# Patient Record
Sex: MALE | Race: WHITE | NOT HISPANIC OR LATINO
[De-identification: names, ages, dates, MRNs, and addresses within clinical notes are randomized per-mention and may not be internally consistent; named-entity substitution may affect disease eponyms.]

---

## 2019-11-26 ENCOUNTER — APPOINTMENT (OUTPATIENT)
Dept: ORTHOPEDIC SURGERY | Facility: CLINIC | Age: 69
End: 2019-11-26
Payer: MEDICARE

## 2019-11-26 VITALS
WEIGHT: 200 LBS | SYSTOLIC BLOOD PRESSURE: 153 MMHG | HEART RATE: 80 BPM | HEIGHT: 70 IN | BODY MASS INDEX: 28.63 KG/M2 | DIASTOLIC BLOOD PRESSURE: 87 MMHG

## 2019-11-26 DIAGNOSIS — Z86.79 PERSONAL HISTORY OF OTHER DISEASES OF THE CIRCULATORY SYSTEM: ICD-10-CM

## 2019-11-26 DIAGNOSIS — M79.641 PAIN IN RIGHT HAND: ICD-10-CM

## 2019-11-26 DIAGNOSIS — Z82.61 FAMILY HISTORY OF ARTHRITIS: ICD-10-CM

## 2019-11-26 DIAGNOSIS — Z78.9 OTHER SPECIFIED HEALTH STATUS: ICD-10-CM

## 2019-11-26 DIAGNOSIS — M65.331 TRIGGER FINGER, RIGHT MIDDLE FINGER: ICD-10-CM

## 2019-11-26 DIAGNOSIS — M18.11 UNILATERAL PRIMARY OSTEOARTHRITIS OF FIRST CARPOMETACARPAL JOINT, RIGHT HAND: ICD-10-CM

## 2019-11-26 DIAGNOSIS — Z87.39 PERSONAL HISTORY OF OTHER DISEASES OF THE MUSCULOSKELETAL SYSTEM AND CONNECTIVE TISSUE: ICD-10-CM

## 2019-11-26 DIAGNOSIS — Z80.9 FAMILY HISTORY OF MALIGNANT NEOPLASM, UNSPECIFIED: ICD-10-CM

## 2019-11-26 DIAGNOSIS — Z72.3 LACK OF PHYSICAL EXERCISE: ICD-10-CM

## 2019-11-26 PROCEDURE — 99203 OFFICE O/P NEW LOW 30 MIN: CPT

## 2019-11-27 PROBLEM — M65.331 TRIGGER MIDDLE FINGER OF RIGHT HAND: Status: ACTIVE | Noted: 2019-11-27

## 2019-11-27 PROBLEM — Z78.9 DOES NOT USE ILLICIT DRUGS: Status: ACTIVE | Noted: 2019-11-26

## 2019-11-27 PROBLEM — Z80.9 FAMILY HISTORY OF MALIGNANT NEOPLASM: Status: ACTIVE | Noted: 2019-11-26

## 2019-11-27 PROBLEM — M79.641 RIGHT HAND PAIN: Status: ACTIVE | Noted: 2019-11-27

## 2019-11-27 PROBLEM — M18.11 PRIMARY OSTEOARTHRITIS OF FIRST CARPOMETACARPAL JOINT OF RIGHT HAND: Status: ACTIVE | Noted: 2019-11-27

## 2019-11-27 PROBLEM — Z72.3 DOES NOT EXERCISE: Status: ACTIVE | Noted: 2019-11-26

## 2019-11-27 PROBLEM — Z82.61 FAMILY HISTORY OF ARTHRITIS: Status: ACTIVE | Noted: 2019-11-26

## 2019-11-27 PROBLEM — Z87.39 HISTORY OF ARTHRITIS: Status: RESOLVED | Noted: 2019-11-26 | Resolved: 2019-11-27

## 2019-11-27 PROBLEM — Z86.79 HISTORY OF HYPERTENSION: Status: RESOLVED | Noted: 2019-11-26 | Resolved: 2019-11-27

## 2019-11-27 PROBLEM — Z78.9 CURRENT NON-SMOKER: Status: ACTIVE | Noted: 2019-11-26

## 2019-11-27 RX ORDER — LOSARTAN POTASSIUM 100 MG/1
TABLET, FILM COATED ORAL
Refills: 0 | Status: ACTIVE | COMMUNITY

## 2019-11-27 RX ORDER — HYDROCHLOROTHIAZIDE 12.5 MG/1
CAPSULE ORAL
Refills: 0 | Status: ACTIVE | COMMUNITY

## 2019-11-27 RX ORDER — FINASTERIDE 1 MG/1
TABLET ORAL
Refills: 0 | Status: ACTIVE | COMMUNITY

## 2021-09-09 ENCOUNTER — TRANSCRIPTION ENCOUNTER (OUTPATIENT)
Age: 71
End: 2021-09-09

## 2022-08-04 ENCOUNTER — NON-APPOINTMENT (OUTPATIENT)
Age: 72
End: 2022-08-04

## 2022-08-05 ENCOUNTER — INPATIENT (INPATIENT)
Facility: HOSPITAL | Age: 72
LOS: 9 days | Discharge: HOME HEALTH | DRG: 871 | End: 2022-08-15
Attending: SURGERY | Admitting: SURGERY
Payer: MEDICARE

## 2022-08-05 VITALS
TEMPERATURE: 101 F | HEART RATE: 115 BPM | WEIGHT: 188.05 LBS | DIASTOLIC BLOOD PRESSURE: 68 MMHG | SYSTOLIC BLOOD PRESSURE: 116 MMHG | RESPIRATION RATE: 18 BRPM | OXYGEN SATURATION: 95 % | HEIGHT: 69 IN

## 2022-08-05 DIAGNOSIS — K35.32 ACUTE APPENDICITIS WITH PERFORATION, LOCALIZED PERITONITIS, AND GANGRENE, WITHOUT ABSCESS: ICD-10-CM

## 2022-08-05 LAB
ALBUMIN SERPL ELPH-MCNC: 4 G/DL — SIGNIFICANT CHANGE UP (ref 3.3–5)
ALP SERPL-CCNC: 114 U/L — SIGNIFICANT CHANGE UP (ref 40–120)
ALT FLD-CCNC: 33 U/L — SIGNIFICANT CHANGE UP (ref 10–45)
ANION GAP SERPL CALC-SCNC: 13 MMOL/L — SIGNIFICANT CHANGE UP (ref 5–17)
APPEARANCE UR: CLEAR — SIGNIFICANT CHANGE UP
APTT BLD: 33.5 SEC — SIGNIFICANT CHANGE UP (ref 27.5–35.5)
AST SERPL-CCNC: 30 U/L — SIGNIFICANT CHANGE UP (ref 10–40)
BACTERIA # UR AUTO: NEGATIVE — SIGNIFICANT CHANGE UP
BASE EXCESS BLDV CALC-SCNC: 2.9 MMOL/L — HIGH (ref -2–2)
BASOPHILS # BLD AUTO: 0.02 K/UL — SIGNIFICANT CHANGE UP (ref 0–0.2)
BASOPHILS NFR BLD AUTO: 0.2 % — SIGNIFICANT CHANGE UP (ref 0–2)
BILIRUB SERPL-MCNC: 0.7 MG/DL — SIGNIFICANT CHANGE UP (ref 0.2–1.2)
BILIRUB UR-MCNC: NEGATIVE — SIGNIFICANT CHANGE UP
BLD GP AB SCN SERPL QL: NEGATIVE — SIGNIFICANT CHANGE UP
BUN SERPL-MCNC: 22 MG/DL — SIGNIFICANT CHANGE UP (ref 7–23)
CA-I SERPL-SCNC: 1.19 MMOL/L — SIGNIFICANT CHANGE UP (ref 1.15–1.33)
CALCIUM SERPL-MCNC: 9.5 MG/DL — SIGNIFICANT CHANGE UP (ref 8.4–10.5)
CHLORIDE BLDV-SCNC: 99 MMOL/L — SIGNIFICANT CHANGE UP (ref 96–108)
CHLORIDE SERPL-SCNC: 97 MMOL/L — SIGNIFICANT CHANGE UP (ref 96–108)
CO2 BLDV-SCNC: 29 MMOL/L — HIGH (ref 22–26)
CO2 SERPL-SCNC: 24 MMOL/L — SIGNIFICANT CHANGE UP (ref 22–31)
COLOR SPEC: YELLOW — SIGNIFICANT CHANGE UP
CREAT SERPL-MCNC: 1.45 MG/DL — HIGH (ref 0.5–1.3)
DIFF PNL FLD: NEGATIVE — SIGNIFICANT CHANGE UP
EGFR: 52 ML/MIN/1.73M2 — LOW
EOSINOPHIL # BLD AUTO: 0 K/UL — SIGNIFICANT CHANGE UP (ref 0–0.5)
EOSINOPHIL NFR BLD AUTO: 0 % — SIGNIFICANT CHANGE UP (ref 0–6)
EPI CELLS # UR: 0 /HPF — SIGNIFICANT CHANGE UP
GAS PNL BLDV: 132 MMOL/L — LOW (ref 136–145)
GAS PNL BLDV: SIGNIFICANT CHANGE UP
GAS PNL BLDV: SIGNIFICANT CHANGE UP
GLUCOSE BLDV-MCNC: 126 MG/DL — HIGH (ref 70–99)
GLUCOSE SERPL-MCNC: 126 MG/DL — HIGH (ref 70–99)
GLUCOSE UR QL: NEGATIVE — SIGNIFICANT CHANGE UP
HCO3 BLDV-SCNC: 28 MMOL/L — SIGNIFICANT CHANGE UP (ref 22–29)
HCT VFR BLD CALC: 39.8 % — SIGNIFICANT CHANGE UP (ref 39–50)
HCT VFR BLDA CALC: 41 % — SIGNIFICANT CHANGE UP (ref 39–51)
HGB BLD CALC-MCNC: 13.6 G/DL — SIGNIFICANT CHANGE UP (ref 12.6–17.4)
HGB BLD-MCNC: 13.3 G/DL — SIGNIFICANT CHANGE UP (ref 13–17)
HYALINE CASTS # UR AUTO: 1 /LPF — SIGNIFICANT CHANGE UP (ref 0–2)
IMM GRANULOCYTES NFR BLD AUTO: 0.5 % — SIGNIFICANT CHANGE UP (ref 0–1.5)
INR BLD: 1.35 RATIO — HIGH (ref 0.88–1.16)
KETONES UR-MCNC: NEGATIVE — SIGNIFICANT CHANGE UP
LACTATE BLDV-MCNC: 0.9 MMOL/L — SIGNIFICANT CHANGE UP (ref 0.7–2)
LEUKOCYTE ESTERASE UR-ACNC: NEGATIVE — SIGNIFICANT CHANGE UP
LYMPHOCYTES # BLD AUTO: 0.66 K/UL — LOW (ref 1–3.3)
LYMPHOCYTES # BLD AUTO: 5.8 % — LOW (ref 13–44)
MCHC RBC-ENTMCNC: 31.5 PG — SIGNIFICANT CHANGE UP (ref 27–34)
MCHC RBC-ENTMCNC: 33.4 GM/DL — SIGNIFICANT CHANGE UP (ref 32–36)
MCV RBC AUTO: 94.3 FL — SIGNIFICANT CHANGE UP (ref 80–100)
MONOCYTES # BLD AUTO: 0.74 K/UL — SIGNIFICANT CHANGE UP (ref 0–0.9)
MONOCYTES NFR BLD AUTO: 6.5 % — SIGNIFICANT CHANGE UP (ref 2–14)
NEUTROPHILS # BLD AUTO: 9.94 K/UL — HIGH (ref 1.8–7.4)
NEUTROPHILS NFR BLD AUTO: 87 % — HIGH (ref 43–77)
NITRITE UR-MCNC: NEGATIVE — SIGNIFICANT CHANGE UP
NRBC # BLD: 0 /100 WBCS — SIGNIFICANT CHANGE UP (ref 0–0)
PCO2 BLDV: 43 MMHG — SIGNIFICANT CHANGE UP (ref 42–55)
PH BLDV: 7.42 — SIGNIFICANT CHANGE UP (ref 7.32–7.43)
PH UR: 6.5 — SIGNIFICANT CHANGE UP (ref 5–8)
PLATELET # BLD AUTO: 167 K/UL — SIGNIFICANT CHANGE UP (ref 150–400)
PO2 BLDV: 35 MMHG — SIGNIFICANT CHANGE UP (ref 25–45)
POTASSIUM BLDV-SCNC: 3.6 MMOL/L — SIGNIFICANT CHANGE UP (ref 3.5–5.1)
POTASSIUM SERPL-MCNC: 3.6 MMOL/L — SIGNIFICANT CHANGE UP (ref 3.5–5.3)
POTASSIUM SERPL-SCNC: 3.6 MMOL/L — SIGNIFICANT CHANGE UP (ref 3.5–5.3)
PROT SERPL-MCNC: 7.9 G/DL — SIGNIFICANT CHANGE UP (ref 6–8.3)
PROT UR-MCNC: ABNORMAL
PROTHROM AB SERPL-ACNC: 15.7 SEC — HIGH (ref 10.5–13.4)
RBC # BLD: 4.22 M/UL — SIGNIFICANT CHANGE UP (ref 4.2–5.8)
RBC # FLD: 13 % — SIGNIFICANT CHANGE UP (ref 10.3–14.5)
RBC CASTS # UR COMP ASSIST: 2 /HPF — SIGNIFICANT CHANGE UP (ref 0–4)
RH IG SCN BLD-IMP: NEGATIVE — SIGNIFICANT CHANGE UP
SAO2 % BLDV: 60.2 % — LOW (ref 67–88)
SARS-COV-2 RNA SPEC QL NAA+PROBE: SIGNIFICANT CHANGE UP
SODIUM SERPL-SCNC: 134 MMOL/L — LOW (ref 135–145)
SP GR SPEC: >1.05 (ref 1.01–1.02)
UROBILINOGEN FLD QL: NEGATIVE — SIGNIFICANT CHANGE UP
WBC # BLD: 11.42 K/UL — HIGH (ref 3.8–10.5)
WBC # FLD AUTO: 11.42 K/UL — HIGH (ref 3.8–10.5)
WBC UR QL: 2 /HPF — SIGNIFICANT CHANGE UP (ref 0–5)

## 2022-08-05 PROCEDURE — 93010 ELECTROCARDIOGRAM REPORT: CPT

## 2022-08-05 PROCEDURE — 99285 EMERGENCY DEPT VISIT HI MDM: CPT | Mod: FS

## 2022-08-05 PROCEDURE — 71045 X-RAY EXAM CHEST 1 VIEW: CPT | Mod: 26

## 2022-08-05 PROCEDURE — 74177 CT ABD & PELVIS W/CONTRAST: CPT | Mod: 26,MA

## 2022-08-05 RX ORDER — ACETAMINOPHEN 500 MG
975 TABLET ORAL ONCE
Refills: 0 | Status: COMPLETED | OUTPATIENT
Start: 2022-08-05 | End: 2022-08-05

## 2022-08-05 RX ORDER — SODIUM CHLORIDE 9 MG/ML
1000 INJECTION, SOLUTION INTRAVENOUS
Refills: 0 | Status: DISCONTINUED | OUTPATIENT
Start: 2022-08-05 | End: 2022-08-11

## 2022-08-05 RX ORDER — ACETAMINOPHEN 500 MG
1000 TABLET ORAL EVERY 6 HOURS
Refills: 0 | Status: COMPLETED | OUTPATIENT
Start: 2022-08-05 | End: 2022-08-06

## 2022-08-05 RX ORDER — SODIUM CHLORIDE 9 MG/ML
1000 INJECTION, SOLUTION INTRAVENOUS ONCE
Refills: 0 | Status: COMPLETED | OUTPATIENT
Start: 2022-08-05 | End: 2022-08-05

## 2022-08-05 RX ORDER — OXYCODONE HYDROCHLORIDE 5 MG/1
2.5 TABLET ORAL EVERY 4 HOURS
Refills: 0 | Status: DISCONTINUED | OUTPATIENT
Start: 2022-08-05 | End: 2022-08-05

## 2022-08-05 RX ORDER — KETOROLAC TROMETHAMINE 30 MG/ML
15 SYRINGE (ML) INJECTION ONCE
Refills: 0 | Status: DISCONTINUED | OUTPATIENT
Start: 2022-08-05 | End: 2022-08-05

## 2022-08-05 RX ORDER — PIPERACILLIN AND TAZOBACTAM 4; .5 G/20ML; G/20ML
3.38 INJECTION, POWDER, LYOPHILIZED, FOR SOLUTION INTRAVENOUS ONCE
Refills: 0 | Status: COMPLETED | OUTPATIENT
Start: 2022-08-05 | End: 2022-08-05

## 2022-08-05 RX ORDER — OXYCODONE HYDROCHLORIDE 5 MG/1
5 TABLET ORAL EVERY 4 HOURS
Refills: 0 | Status: DISCONTINUED | OUTPATIENT
Start: 2022-08-05 | End: 2022-08-05

## 2022-08-05 RX ORDER — FINASTERIDE 5 MG/1
5 TABLET, FILM COATED ORAL DAILY
Refills: 0 | Status: DISCONTINUED | OUTPATIENT
Start: 2022-08-05 | End: 2022-08-15

## 2022-08-05 RX ORDER — HEPARIN SODIUM 5000 [USP'U]/ML
5000 INJECTION INTRAVENOUS; SUBCUTANEOUS EVERY 8 HOURS
Refills: 0 | Status: DISCONTINUED | OUTPATIENT
Start: 2022-08-05 | End: 2022-08-12

## 2022-08-05 RX ORDER — PIPERACILLIN AND TAZOBACTAM 4; .5 G/20ML; G/20ML
3.38 INJECTION, POWDER, LYOPHILIZED, FOR SOLUTION INTRAVENOUS EVERY 8 HOURS
Refills: 0 | Status: DISCONTINUED | OUTPATIENT
Start: 2022-08-05 | End: 2022-08-08

## 2022-08-05 RX ADMIN — HEPARIN SODIUM 5000 UNIT(S): 5000 INJECTION INTRAVENOUS; SUBCUTANEOUS at 21:44

## 2022-08-05 RX ADMIN — Medication 400 MILLIGRAM(S): at 23:21

## 2022-08-05 RX ADMIN — Medication 1000 MILLIGRAM(S): at 23:30

## 2022-08-05 RX ADMIN — Medication 15 MILLIGRAM(S): at 17:59

## 2022-08-05 RX ADMIN — Medication 975 MILLIGRAM(S): at 17:05

## 2022-08-05 RX ADMIN — SODIUM CHLORIDE 1000 MILLILITER(S): 9 INJECTION, SOLUTION INTRAVENOUS at 17:05

## 2022-08-05 RX ADMIN — SODIUM CHLORIDE 1000 MILLILITER(S): 9 INJECTION, SOLUTION INTRAVENOUS at 19:24

## 2022-08-05 RX ADMIN — PIPERACILLIN AND TAZOBACTAM 25 GRAM(S): 4; .5 INJECTION, POWDER, LYOPHILIZED, FOR SOLUTION INTRAVENOUS at 21:43

## 2022-08-05 RX ADMIN — PIPERACILLIN AND TAZOBACTAM 200 GRAM(S): 4; .5 INJECTION, POWDER, LYOPHILIZED, FOR SOLUTION INTRAVENOUS at 17:59

## 2022-08-05 NOTE — H&P ADULT - ASSESSMENT
Patient is a 71yo M w/ hx of HTN, BPH, kidney stones that presents with  waxing and waning fatigue for approximately 3-4 weeks, loss of appetite and 10-15 unintentional weight loss. Over the past 24 hrs, he developed RLQ abdominal pain, fevers and chills. Labs remarkable for leukocytosis to 11, and CT scan consistent with perforated appendicitis.       Plan:  - Admit to Red Team Surgery, under Dr. Ion Kilpatrick  - Pain control prn  - NPO/IVF/Zosyn  - on home meds   - DVT ppx       Discussed with Attending on Call, Dr. Ion Kilpatrick    [facility]  [id number]

## 2022-08-05 NOTE — H&P ADULT - NSICDXPASTMEDICALHX_GEN_ALL_CORE_FT
PAST MEDICAL HISTORY:  BPH (benign prostatic hyperplasia)     HTN (hypertension)     Kidney stones

## 2022-08-05 NOTE — PATIENT PROFILE ADULT - FALL HARM RISK - UNIVERSAL INTERVENTIONS
Bed in lowest position, wheels locked, appropriate side rails in place/Call bell, personal items and telephone in reach/Instruct patient to call for assistance before getting out of bed or chair/Non-slip footwear when patient is out of bed/[place] to call system/Physically safe environment - no spills, clutter or unnecessary equipment/Purposeful Proactive Rounding/Room/bathroom lighting operational, light cord in reach

## 2022-08-05 NOTE — H&P ADULT - NSHPPHYSICALEXAM_GEN_ALL_CORE
T(C): 37.8 (08-05-22 @ 18:00), Max: 39 (08-05-22 @ 17:46)  HR: 98 (08-05-22 @ 18:00) (93 - 115)  BP: 124/63 (08-05-22 @ 18:00) (102/70 - 141/72)  RR: 18 (08-05-22 @ 18:00) (18 - 18)  SpO2: 96% (08-05-22 @ 18:00) (95% - 97%)    CONSTITUTIONAL: Well groomed, no apparent distress  EYES: PERRLA and symmetric, EOMI, No conjunctival or scleral injection, non-icteric  RESPIRATORY: No respiratory distress, no use of accessory muscles; CTA b/l,   CARDIOVASCULAR:  +S1S2, no murmurs, no rubs, no gallops  GASTROINTESTINAL: Soft, mildly tender in RLQ, mildly distended, no rebound, no guarding; No palpable masses; no hepatosplenomegaly; no hernia palpated;  SKIN: No rashes or ulcers noted; no subcutaneous nodules or induration palpable  PSYCHIATRIC: Appropriate insight/judgment; A+O x 3, mood and affect appropriate, recent/remote memory intact

## 2022-08-05 NOTE — ED PROVIDER NOTE - CLINICAL SUMMARY MEDICAL DECISION MAKING FREE TEXT BOX
appendicitis outpt scan, no images here, sx requesting repeat scan as unable to obtain images, will cover w/ zosyn admit to sx pt hd stable aaox3

## 2022-08-05 NOTE — ED PROVIDER NOTE - PHYSICAL EXAMINATION
GEN: Pt ill appearing but non-toxic in NAD, A&Ox3.  PSYCH: Affect and mood appropriate.  EYES: Sclera white w/o injection, EOMI, PERRLA.   ENT: MM dry. Neck supple FROM. Airway patent.  RESP: CTA b/l, no wheezes, rales, or rhonchi.   CARDIAC: RRR, clear distinct S1, S2, no appreciable murmurs.  ABD: Abdomen soft, +RLQ ttp, no rebound or guarding. No CVAT b/l.  MSK: Moving all extremities.  NEURO: No focal motor or sensory deficits.  VASC: Radial pulses 2+ b/l. No edema or calf tenderness.  SKIN: No rashes or lesions.

## 2022-08-05 NOTE — ED PROVIDER NOTE - OBJECTIVE STATEMENT
72y M pmhx HTN, kidney stones, BPH, presents to ED c/o RLQ pain x 1 week, fever/chills x 2 days. Poor appetite w/ 15lbs weight loss, difficulty urinating and moving bowels over last month and a half. Went to [facility] and urologist today, found to have appendicitis on non-con CT abdomen and pelvis at [facility]  and referred to ED by urologist. Denies prior abd surgeries, nvd, dysuria, hematuria, chest pain, sob, cough.

## 2022-08-05 NOTE — ED ADULT NURSE REASSESSMENT NOTE - NS ED NURSE REASSESS COMMENT FT1
Report received from Ale WAKEFIELD in Red. Upon assessment, pt AxOx3, sitting up in bed, and speaking in full sentences. Pt denies current pain or discomfort. Pt updated on plan of care, awaiting surgery consult. Pt maintained on continuous cardiac monitor. Safety and comfort measures maintained- bed in lowest position, locked, and blanket given.

## 2022-08-05 NOTE — ED PROVIDER NOTE - NS ED ATTENDING STATEMENT MOD
This was a shared visit with the ARISTEO. I reviewed and verified the documentation and independently performed the documented:

## 2022-08-05 NOTE — ED ADULT TRIAGE NOTE - CHIEF COMPLAINT QUOTE
abdominal pain worsening x the passed 3 -4 weeks now associated with a fever since yesterday as high as 102F orally at home

## 2022-08-05 NOTE — H&P ADULT - ATTENDING COMMENTS
date of service 8/5/22    pt seen and examined  pt chart and imaging reviewed in detail  agree with note above  72M pmhx of HTN, BPH, kidney stones that presents with  waxing and waning fatigue for approximately 3-4 weeks, loss of appetite and 10-15 unintentional weight loss. Over the past 24 hrs, he developed RLQ abdominal pain, fevers and chills. Labs remarkable for leukocytosis to 11, and CT scan consistent with perforated appendicitis with phlegmonous changes no drainable collection.   admit to red team surgery.  no acute surgical intervention at this time for likely chronic perforated appendicitis.  npo, ivf, iv abx  serial abd exams, monitor fever curve  f/u labs in am  continue nonoperative rx for now, will likely need interval imaging ~ 2-4 weeks and benefit from interval appendectomy tentatively 6-8 weeks pending his clinical course, if fails to improve may require rpt imaging r/o collection/abscess and possible intervention/exploration sooner.   d/w pt @ bedside in detail.

## 2022-08-05 NOTE — H&P ADULT - HISTORY OF PRESENT ILLNESS
Patient is a 73yo M w/ hx of HTN, BPH, kidney stones that presents after a CT noncontrast obtained at an outside office concerning for acute appendicitis. Patient reports waxing and waning fatigue for approximately 3-4 weeks, loss of appetite and 10-15 unintentional weight loss. Over the past 24 hrs, he developed RLQ abdominal pain, fevers and chills. No nausea or vomiting. Passing flatus and having bowel movements that sometimes are darker. Last colonoscopy was 2-3 yrs ago that per patient showed polyps that were removed.     In the ED, patient was febrile to 102.2 and tachycardic to 115. He received a dose of zosyn and one 1L bolus of LR.

## 2022-08-05 NOTE — ED PROVIDER NOTE - ATTENDING APP SHARED VISIT CONTRIBUTION OF CARE
72 M w/ hx of HTN, kidney stones BPH, here w/ RLQ pain started yesterday w/ fevers, and chills. pt w/ dec po for the past 2 weeks, pt w/ no cp, no sob, mild nausea, pt reports currentl was tx'd for a groin abscess 2 weeks ago in the L groin was on a "c' medication. Pt w/ no dysuria, no urinary freq/urgency, no headache, no cp, no lightheadedness. Pt reports today he spoke w/ urology who ordered a CT scan, which showed appendicitis. Pt reports he was referred to the hospital discussion w/ SX unable to obtain imaging recommending repeat ct scan. On exam, pt is awake and alert in no distress, he is tachycardic, w/ clear lungs soft abdomen, w/ RLQ tenderness, plan for labs imaging and sx consultation, pt likely w/ appendicitis, will cover w/ antibiotics

## 2022-08-05 NOTE — H&P ADULT - NSHPLABSRESULTS_GEN_ALL_CORE
13.3   11.42 )-----------( 167      ( 05 Aug 2022 17:11 )             39.8   08-05    134<L>  |  97  |  22  ----------------------------<  126<H>  3.6   |  24  |  1.45<H>    Ca    9.5      05 Aug 2022 17:11    TPro  7.9  /  Alb  4.0  /  TBili  0.7  /  DBili  x   /  AST  30  /  ALT  33  /  AlkPhos  114  08-05  < from: CT Abdomen and Pelvis w/ IV Cont (08.05.22 @ 18:39) >    FINDINGS:  LOWER CHEST: Mild atelectasis in the bilateral posterior lower lobes.   Normal-sized heart.    LIVER: Within normal limits.  BILE DUCTS: Normal caliber.  GALLBLADDER: Cholelithiasis. No pericholecystic fluid or gallbladder wall   thickening.  SPLEEN: Within normal limits.  PANCREAS: Within normal limits.  ADRENALS: Within normal limits.  KIDNEYS/URETERS: Simple cysts bilaterally, largest of which measures 9.4   cm in the right mid pole and 6 cm in the left inferior pole.   Nonobstructing bilateral renal calculi measuring up to 5 mm in the   midpole left calyx.    BLADDER: Mild circumferential wall thickening likely secondary to chronic   increased outlet pressures in the setting of BPH.  REPRODUCTIVE ORGANS:Massively enlarged prostate measuring up to 6.8 by   6.1 x 6.4 cm, 133 mL. There is a circular hypoattenuating lesion about   the left mid gland measuring 3.1 cm (series 3-98). There is a partially   calcified low-attenuation region in the right posteromedial gland with   adjacent dense calcifications.    BOWEL: No bowel obstruction. Appendix is dilated to 2 cm with mural   thickening and irregular wall. Significant adjacent fat stranding with   trace periappendiceal fluid (series 3-80, series 5-25 through 5-50).   Diverticulosis without evidence of diverticulitis.  PERITONEUM: No ascites.  VESSELS: Mild atherosclerotic changes.  RETROPERITONEUM/LYMPH NODES: No lymphadenopathy.  ABDOMINAL WALL: Within normal limits.  BONES: Degenerative changes.    IMPRESSION:  Acute appendicitis, likely perforated.  Massively enlarged prostate gland with changes of hypertrophy. A focal   low-density lesion in the left mid gland may be related to hypertrophic   changes but is not well evaluated on this exam. Correlation with digital   rectal exam and PSA levels is advised.    --- End of Report ---           KARLO KISER MD; Resident Radiologist  This document has been electronically signed.  VLADISLAV PETERSEN MD; Attending Radiologist  This document has beenelectronically signed. Aug  5 2022  7:40PM    < end of copied text >

## 2022-08-05 NOTE — ED PROVIDER NOTE - PROGRESS NOTE DETAILS
Woo Whelan PA-C: s/w surgery, unable to view outpt CT, rec rpt CTap and will see in ED. Woo Whelan PA-C: tba surgery Dr. Kilpatrick

## 2022-08-05 NOTE — ED ADULT NURSE NOTE - OBJECTIVE STATEMENT
73 yo presents to the ED from home. A&Ox4, ambulatory c/o RLQ abdominal pain worsening x past 3 -4 weeks. now associated with a fever since yesterday as high as 102F orally at home. went to urgent care this AM, tested negative for covid. reports dry cough. reports lack of appetite. pt reports nausea. reports urinary frequency. reports 15 pound weight loss over the last 1 month. history of kidney stones, follows with urologist, urologist ordered CT today at [facility]- showed appendicitis and was sent to ED for further care. CODE SEPSIS called upon arrival to ED. Two large bore IV's in place 18G RAC 20G LAC. Fluids running. 2 sets of blood cultures sent to lab. VBG sent. See MAR for antibiotics given. no Tylenol or Motrin administered PTA. Patient undressed and placed into gown, call bell in hand and side rails up for safety. warm blanket provided, pt in no acute distress. EKG done at bedside. pt placed on CM.

## 2022-08-05 NOTE — ED PROCEDURE NOTE - PROCEDURE ADDITIONAL DETAILS
Emergency Department Focused Ultrasound performed at patient's bedside for educational purposes. An appropriate follow up study is ordered. Pt aware of renal cysts, has yearly outpatient US of the kidneys, and it is being followed by his PCP. -Ruddy Scott PA-C

## 2022-08-06 LAB
ANION GAP SERPL CALC-SCNC: 12 MMOL/L — SIGNIFICANT CHANGE UP (ref 5–17)
BUN SERPL-MCNC: 24 MG/DL — HIGH (ref 7–23)
CALCIUM SERPL-MCNC: 8.6 MG/DL — SIGNIFICANT CHANGE UP (ref 8.4–10.5)
CHLORIDE SERPL-SCNC: 99 MMOL/L — SIGNIFICANT CHANGE UP (ref 96–108)
CO2 SERPL-SCNC: 25 MMOL/L — SIGNIFICANT CHANGE UP (ref 22–31)
CREAT SERPL-MCNC: 1.48 MG/DL — HIGH (ref 0.5–1.3)
EGFR: 50 ML/MIN/1.73M2 — LOW
GLUCOSE SERPL-MCNC: 118 MG/DL — HIGH (ref 70–99)
HCT VFR BLD CALC: 35.2 % — LOW (ref 39–50)
HCV AB S/CO SERPL IA: 0.06 S/CO — SIGNIFICANT CHANGE UP (ref 0–0.99)
HCV AB SERPL-IMP: SIGNIFICANT CHANGE UP
HGB BLD-MCNC: 11.8 G/DL — LOW (ref 13–17)
MAGNESIUM SERPL-MCNC: 1.6 MG/DL — SIGNIFICANT CHANGE UP (ref 1.6–2.6)
MCHC RBC-ENTMCNC: 31.1 PG — SIGNIFICANT CHANGE UP (ref 27–34)
MCHC RBC-ENTMCNC: 33.5 GM/DL — SIGNIFICANT CHANGE UP (ref 32–36)
MCV RBC AUTO: 92.6 FL — SIGNIFICANT CHANGE UP (ref 80–100)
NRBC # BLD: 0 /100 WBCS — SIGNIFICANT CHANGE UP (ref 0–0)
PHOSPHATE SERPL-MCNC: 2.6 MG/DL — SIGNIFICANT CHANGE UP (ref 2.5–4.5)
PLATELET # BLD AUTO: 130 K/UL — LOW (ref 150–400)
POTASSIUM SERPL-MCNC: 3.6 MMOL/L — SIGNIFICANT CHANGE UP (ref 3.5–5.3)
POTASSIUM SERPL-SCNC: 3.6 MMOL/L — SIGNIFICANT CHANGE UP (ref 3.5–5.3)
RBC # BLD: 3.8 M/UL — LOW (ref 4.2–5.8)
RBC # FLD: 12.8 % — SIGNIFICANT CHANGE UP (ref 10.3–14.5)
SODIUM SERPL-SCNC: 136 MMOL/L — SIGNIFICANT CHANGE UP (ref 135–145)
WBC # BLD: 8.27 K/UL — SIGNIFICANT CHANGE UP (ref 3.8–10.5)
WBC # FLD AUTO: 8.27 K/UL — SIGNIFICANT CHANGE UP (ref 3.8–10.5)

## 2022-08-06 RX ORDER — SODIUM CHLORIDE 9 MG/ML
500 INJECTION, SOLUTION INTRAVENOUS ONCE
Refills: 0 | Status: COMPLETED | OUTPATIENT
Start: 2022-08-06 | End: 2022-08-06

## 2022-08-06 RX ORDER — POTASSIUM PHOSPHATE, MONOBASIC POTASSIUM PHOSPHATE, DIBASIC 236; 224 MG/ML; MG/ML
15 INJECTION, SOLUTION INTRAVENOUS ONCE
Refills: 0 | Status: COMPLETED | OUTPATIENT
Start: 2022-08-06 | End: 2022-08-06

## 2022-08-06 RX ORDER — MAGNESIUM SULFATE 500 MG/ML
2 VIAL (ML) INJECTION ONCE
Refills: 0 | Status: COMPLETED | OUTPATIENT
Start: 2022-08-06 | End: 2022-08-06

## 2022-08-06 RX ADMIN — FINASTERIDE 5 MILLIGRAM(S): 5 TABLET, FILM COATED ORAL at 12:49

## 2022-08-06 RX ADMIN — PIPERACILLIN AND TAZOBACTAM 25 GRAM(S): 4; .5 INJECTION, POWDER, LYOPHILIZED, FOR SOLUTION INTRAVENOUS at 15:42

## 2022-08-06 RX ADMIN — PIPERACILLIN AND TAZOBACTAM 25 GRAM(S): 4; .5 INJECTION, POWDER, LYOPHILIZED, FOR SOLUTION INTRAVENOUS at 05:04

## 2022-08-06 RX ADMIN — Medication 1000 MILLIGRAM(S): at 05:54

## 2022-08-06 RX ADMIN — HEPARIN SODIUM 5000 UNIT(S): 5000 INJECTION INTRAVENOUS; SUBCUTANEOUS at 21:01

## 2022-08-06 RX ADMIN — SODIUM CHLORIDE 100 MILLILITER(S): 9 INJECTION, SOLUTION INTRAVENOUS at 18:12

## 2022-08-06 RX ADMIN — PIPERACILLIN AND TAZOBACTAM 25 GRAM(S): 4; .5 INJECTION, POWDER, LYOPHILIZED, FOR SOLUTION INTRAVENOUS at 21:02

## 2022-08-06 RX ADMIN — Medication 400 MILLIGRAM(S): at 05:04

## 2022-08-06 RX ADMIN — POTASSIUM PHOSPHATE, MONOBASIC POTASSIUM PHOSPHATE, DIBASIC 62.5 MILLIMOLE(S): 236; 224 INJECTION, SOLUTION INTRAVENOUS at 12:52

## 2022-08-06 RX ADMIN — HEPARIN SODIUM 5000 UNIT(S): 5000 INJECTION INTRAVENOUS; SUBCUTANEOUS at 15:42

## 2022-08-06 RX ADMIN — SODIUM CHLORIDE 500 MILLILITER(S): 9 INJECTION, SOLUTION INTRAVENOUS at 10:43

## 2022-08-06 RX ADMIN — Medication 400 MILLIGRAM(S): at 18:08

## 2022-08-06 RX ADMIN — HEPARIN SODIUM 5000 UNIT(S): 5000 INJECTION INTRAVENOUS; SUBCUTANEOUS at 05:05

## 2022-08-06 RX ADMIN — Medication 25 GRAM(S): at 10:40

## 2022-08-06 RX ADMIN — Medication 400 MILLIGRAM(S): at 12:47

## 2022-08-06 NOTE — PROGRESS NOTE ADULT - SUBJECTIVE AND OBJECTIVE BOX
Red team Surgery Progress Note    INTERVAL EVENTS:   No acute events overnight.    SUBJECTIVE: Patient seen and examined at bedside with surgical team,     OBJECTIVE:    Vital Signs Last 24 Hrs  T(C): 36.9 (06 Aug 2022 00:42), Max: 39 (05 Aug 2022 17:46)  T(F): 98.4 (06 Aug 2022 00:42), Max: 102.2 (05 Aug 2022 17:46)  HR: 82 (06 Aug 2022 00:42) (72 - 115)  BP: 110/63 (06 Aug 2022 00:42) (102/70 - 141/72)  BP(mean): --  RR: 18 (06 Aug 2022 00:42) (16 - 18)  SpO2: 96% (06 Aug 2022 00:42) (95% - 100%)    Parameters below as of 06 Aug 2022 00:42  Patient On (Oxygen Delivery Method): room air    I&O's Detail  MEDICATIONS  (STANDING):  acetaminophen   IVPB .. 1000 milliGRAM(s) IV Intermittent every 6 hours  finasteride 5 milliGRAM(s) Oral daily  heparin   Injectable 5000 Unit(s) SubCutaneous every 8 hours  lactated ringers. 1000 milliLiter(s) (100 mL/Hr) IV Continuous <Continuous>  piperacillin/tazobactam IVPB.. 3.375 Gram(s) IV Intermittent every 8 hours    MEDICATIONS  (PRN):  oxyCODONE    IR 5 milliGRAM(s) Oral every 4 hours PRN Severe Pain (7 - 10)  oxyCODONE    IR 2.5 milliGRAM(s) Oral every 4 hours PRN Moderate Pain (4 - 6)      PHYSICAL EXAM:  Constitutional: A&Ox3, NAD  Respiratory: Unlabored breathing  Abdomen: ?????  Extremities: WWP, CALERO spontaneously    LABS:                        13.3   11.42 )-----------( 167      ( 05 Aug 2022 17:11 )             39.8     08-05    134<L>  |  97  |  22  ----------------------------<  126<H>  3.6   |  24  |  1.45<H>    Ca    9.5      05 Aug 2022 17:11    TPro  7.9  /  Alb  4.0  /  TBili  0.7  /  DBili  x   /  AST  30  /  ALT  33  /  AlkPhos  114  08-05    PT/INR - ( 05 Aug 2022 17:11 )   PT: 15.7 sec;   INR: 1.35 ratio         PTT - ( 05 Aug 2022 17:11 )  PTT:33.5 sec  LIVER FUNCTIONS - ( 05 Aug 2022 17:11 )  Alb: 4.0 g/dL / Pro: 7.9 g/dL / ALK PHOS: 114 U/L / ALT: 33 U/L / AST: 30 U/L / GGT: x           Urinalysis Basic - ( 05 Aug 2022 21:57 )    Color: Yellow / Appearance: Clear / SG: >1.050 / pH: x  Gluc: x / Ketone: Negative  / Bili: Negative / Urobili: Negative   Blood: x / Protein: 30 mg/dL / Nitrite: Negative   Leuk Esterase: Negative / RBC: 2 /hpf / WBC 2 /HPF   Sq Epi: x / Non Sq Epi: 0 /hpf / Bacteria: Negative      ABO Interpretation: A (08-05-22 @ 18:14)  ABO Interpretation: A (08-05-22 @ 17:19)      IMAGING:     Red team Surgery Progress Note    INTERVAL EVENTS:   No acute events overnight.    SUBJECTIVE: Patient seen and examined at bedside with surgical team, was febrile overnight, not febrile currently. Pain well controlled. Has GI function.     OBJECTIVE:    Vital Signs Last 24 Hrs  T(C): 36.9 (06 Aug 2022 00:42), Max: 39 (05 Aug 2022 17:46)  T(F): 98.4 (06 Aug 2022 00:42), Max: 102.2 (05 Aug 2022 17:46)  HR: 82 (06 Aug 2022 00:42) (72 - 115)  BP: 110/63 (06 Aug 2022 00:42) (102/70 - 141/72)  BP(mean): --  RR: 18 (06 Aug 2022 00:42) (16 - 18)  SpO2: 96% (06 Aug 2022 00:42) (95% - 100%)    Parameters below as of 06 Aug 2022 00:42  Patient On (Oxygen Delivery Method): room air    I&O's Detail  MEDICATIONS  (STANDING):  acetaminophen   IVPB .. 1000 milliGRAM(s) IV Intermittent every 6 hours  finasteride 5 milliGRAM(s) Oral daily  heparin   Injectable 5000 Unit(s) SubCutaneous every 8 hours  lactated ringers. 1000 milliLiter(s) (100 mL/Hr) IV Continuous <Continuous>  piperacillin/tazobactam IVPB.. 3.375 Gram(s) IV Intermittent every 8 hours    MEDICATIONS  (PRN):  oxyCODONE    IR 5 milliGRAM(s) Oral every 4 hours PRN Severe Pain (7 - 10)  oxyCODONE    IR 2.5 milliGRAM(s) Oral every 4 hours PRN Moderate Pain (4 - 6)      PHYSICAL EXAM:  Constitutional: A&Ox3, NAD  Respiratory: Unlabored breathing  Abdomen: nontender, nondistended  Extremities: WWP, CALERO spontaneously    LABS:                        13.3   11.42 )-----------( 167      ( 05 Aug 2022 17:11 )             39.8     08-05    134<L>  |  97  |  22  ----------------------------<  126<H>  3.6   |  24  |  1.45<H>    Ca    9.5      05 Aug 2022 17:11    TPro  7.9  /  Alb  4.0  /  TBili  0.7  /  DBili  x   /  AST  30  /  ALT  33  /  AlkPhos  114  08-05    PT/INR - ( 05 Aug 2022 17:11 )   PT: 15.7 sec;   INR: 1.35 ratio         PTT - ( 05 Aug 2022 17:11 )  PTT:33.5 sec  LIVER FUNCTIONS - ( 05 Aug 2022 17:11 )  Alb: 4.0 g/dL / Pro: 7.9 g/dL / ALK PHOS: 114 U/L / ALT: 33 U/L / AST: 30 U/L / GGT: x           Urinalysis Basic - ( 05 Aug 2022 21:57 )    Color: Yellow / Appearance: Clear / SG: >1.050 / pH: x  Gluc: x / Ketone: Negative  / Bili: Negative / Urobili: Negative   Blood: x / Protein: 30 mg/dL / Nitrite: Negative   Leuk Esterase: Negative / RBC: 2 /hpf / WBC 2 /HPF   Sq Epi: x / Non Sq Epi: 0 /hpf / Bacteria: Negative      ABO Interpretation: A (08-05-22 @ 18:14)  ABO Interpretation: A (08-05-22 @ 17:19)      IMAGING:

## 2022-08-06 NOTE — PROGRESS NOTE ADULT - ASSESSMENT
Patient is a 71yo M w/ hx of HTN, BPH, kidney stones that presents with  waxing and waning fatigue for approximately 3-4 weeks, loss of appetite and 10-15 unintentional weight loss. Over the past 24 hrs, he developed RLQ abdominal pain, fevers and chills. Labs remarkable for leukocytosis to 11, and CT scan consistent with perforated appendicitis.       Plan:    - NPO/IVF  - C/w Zosyn  - Pain control prn  - on home meds   - DVT ppx         [facility]  [id number]    Patient is a 73yo M w/ hx of HTN, BPH, kidney stones that presents with  waxing and waning fatigue for approximately 3-4 weeks, loss of appetite and 10-15 unintentional weight loss. Over the past 24 hrs, he developed RLQ abdominal pain, fevers and chills. Labs remarkable for leukocytosis to 11, and CT scan consistent with perforated appendicitis.       Plan:    - Monitor for fevers, F/u CBC, fever work up (blood cultures pending)  - NPO/IVF  - C/w Zosyn  - Pain control prn  - on home meds   - DVT ppx         [facility]  [id number]

## 2022-08-07 LAB
ANION GAP SERPL CALC-SCNC: 13 MMOL/L — SIGNIFICANT CHANGE UP (ref 5–17)
BUN SERPL-MCNC: 17 MG/DL — SIGNIFICANT CHANGE UP (ref 7–23)
CALCIUM SERPL-MCNC: 8.3 MG/DL — LOW (ref 8.4–10.5)
CHLORIDE SERPL-SCNC: 102 MMOL/L — SIGNIFICANT CHANGE UP (ref 96–108)
CO2 SERPL-SCNC: 23 MMOL/L — SIGNIFICANT CHANGE UP (ref 22–31)
CREAT SERPL-MCNC: 1.26 MG/DL — SIGNIFICANT CHANGE UP (ref 0.5–1.3)
CULTURE RESULTS: NO GROWTH — SIGNIFICANT CHANGE UP
EGFR: 61 ML/MIN/1.73M2 — SIGNIFICANT CHANGE UP
GLUCOSE SERPL-MCNC: 86 MG/DL — SIGNIFICANT CHANGE UP (ref 70–99)
HCT VFR BLD CALC: 34.1 % — LOW (ref 39–50)
HGB BLD-MCNC: 11.4 G/DL — LOW (ref 13–17)
MAGNESIUM SERPL-MCNC: 2 MG/DL — SIGNIFICANT CHANGE UP (ref 1.6–2.6)
MCHC RBC-ENTMCNC: 31.3 PG — SIGNIFICANT CHANGE UP (ref 27–34)
MCHC RBC-ENTMCNC: 33.4 GM/DL — SIGNIFICANT CHANGE UP (ref 32–36)
MCV RBC AUTO: 93.7 FL — SIGNIFICANT CHANGE UP (ref 80–100)
NRBC # BLD: 0 /100 WBCS — SIGNIFICANT CHANGE UP (ref 0–0)
PHOSPHATE SERPL-MCNC: 2.8 MG/DL — SIGNIFICANT CHANGE UP (ref 2.5–4.5)
PLATELET # BLD AUTO: 118 K/UL — LOW (ref 150–400)
POTASSIUM SERPL-MCNC: 3.7 MMOL/L — SIGNIFICANT CHANGE UP (ref 3.5–5.3)
POTASSIUM SERPL-SCNC: 3.7 MMOL/L — SIGNIFICANT CHANGE UP (ref 3.5–5.3)
RBC # BLD: 3.64 M/UL — LOW (ref 4.2–5.8)
RBC # FLD: 12.7 % — SIGNIFICANT CHANGE UP (ref 10.3–14.5)
SODIUM SERPL-SCNC: 137 MMOL/L — SIGNIFICANT CHANGE UP (ref 135–145)
SPECIMEN SOURCE: SIGNIFICANT CHANGE UP
WBC # BLD: 7.12 K/UL — SIGNIFICANT CHANGE UP (ref 3.8–10.5)
WBC # FLD AUTO: 7.12 K/UL — SIGNIFICANT CHANGE UP (ref 3.8–10.5)

## 2022-08-07 RX ORDER — ACETAMINOPHEN 500 MG
1000 TABLET ORAL EVERY 6 HOURS
Refills: 0 | Status: COMPLETED | OUTPATIENT
Start: 2022-08-07 | End: 2022-08-07

## 2022-08-07 RX ORDER — ACETAMINOPHEN 500 MG
975 TABLET ORAL EVERY 6 HOURS
Refills: 0 | Status: DISCONTINUED | OUTPATIENT
Start: 2022-08-07 | End: 2022-08-15

## 2022-08-07 RX ADMIN — FINASTERIDE 5 MILLIGRAM(S): 5 TABLET, FILM COATED ORAL at 12:20

## 2022-08-07 RX ADMIN — Medication 400 MILLIGRAM(S): at 01:04

## 2022-08-07 RX ADMIN — HEPARIN SODIUM 5000 UNIT(S): 5000 INJECTION INTRAVENOUS; SUBCUTANEOUS at 13:33

## 2022-08-07 RX ADMIN — PIPERACILLIN AND TAZOBACTAM 25 GRAM(S): 4; .5 INJECTION, POWDER, LYOPHILIZED, FOR SOLUTION INTRAVENOUS at 05:02

## 2022-08-07 RX ADMIN — HEPARIN SODIUM 5000 UNIT(S): 5000 INJECTION INTRAVENOUS; SUBCUTANEOUS at 21:02

## 2022-08-07 RX ADMIN — Medication 1000 MILLIGRAM(S): at 05:44

## 2022-08-07 RX ADMIN — PIPERACILLIN AND TAZOBACTAM 25 GRAM(S): 4; .5 INJECTION, POWDER, LYOPHILIZED, FOR SOLUTION INTRAVENOUS at 21:02

## 2022-08-07 RX ADMIN — PIPERACILLIN AND TAZOBACTAM 25 GRAM(S): 4; .5 INJECTION, POWDER, LYOPHILIZED, FOR SOLUTION INTRAVENOUS at 13:31

## 2022-08-07 RX ADMIN — HEPARIN SODIUM 5000 UNIT(S): 5000 INJECTION INTRAVENOUS; SUBCUTANEOUS at 05:02

## 2022-08-07 RX ADMIN — Medication 400 MILLIGRAM(S): at 17:22

## 2022-08-07 RX ADMIN — Medication 400 MILLIGRAM(S): at 05:01

## 2022-08-07 RX ADMIN — Medication 1000 MILLIGRAM(S): at 01:30

## 2022-08-07 RX ADMIN — Medication 1000 MILLIGRAM(S): at 12:46

## 2022-08-07 RX ADMIN — Medication 400 MILLIGRAM(S): at 12:20

## 2022-08-07 NOTE — CHART NOTE - NSCHARTNOTEFT_GEN_A_CORE
Called by RN for fever to 100.6. Saw patient at bedside. No acute distress, abdomen is soft, mild ttp on RLQ. Endorses mild, dull headache. Denies sharp/severe headache/neck ache, shortness of breath, cough, chest pain, nausea, vomiting, or pain with urination.    Vital Signs Last 24 Hrs  T(C): 38.1 (07 Aug 2022 00:33), Max: 39.3 (06 Aug 2022 05:04)  T(F): 100.6 (07 Aug 2022 00:33), Max: 102.7 (06 Aug 2022 05:04)  HR: 84 (06 Aug 2022 20:53) (82 - 98)  BP: 109/67 (06 Aug 2022 20:53) (109/67 - 152/82)  BP(mean): --  RR: 18 (06 Aug 2022 20:53) (18 - 18)  SpO2: 95% (06 Aug 2022 20:53) (94% - 96%)    Parameters below as of 06 Aug 2022 20:53  Patient On (Oxygen Delivery Method): room air    Assessment:   Blood cultures drawn previously for perfed appendicitis w non-op mgmt. No clincal evidence of new infection.     PLAN:   -IV tylenol for fever symptom and pain relief  - continue to monitor

## 2022-08-07 NOTE — PROGRESS NOTE ADULT - SUBJECTIVE AND OBJECTIVE BOX
Red team Surgery Progress Note    INTERVAL EVENTS:   Experienced fever to 100.6, saw at bedside. Pt is asymptomatic, complaining of mild headache. Got IV tylenol    SUBJECTIVE: Patient seen and examined at bedside with surgical team, was febrile overnight, not febrile currently. Pain well controlled. Has GI function.     OBJECTIVE:    Vital Signs Last 24 Hrs  T(C): 36.9 (06 Aug 2022 00:42), Max: 39 (05 Aug 2022 17:46)  T(F): 98.4 (06 Aug 2022 00:42), Max: 102.2 (05 Aug 2022 17:46)  HR: 82 (06 Aug 2022 00:42) (72 - 115)  BP: 110/63 (06 Aug 2022 00:42) (102/70 - 141/72)  BP(mean): --  RR: 18 (06 Aug 2022 00:42) (16 - 18)  SpO2: 96% (06 Aug 2022 00:42) (95% - 100%)    Parameters below as of 06 Aug 2022 00:42  Patient On (Oxygen Delivery Method): room air    I&O's Detail  MEDICATIONS  (STANDING):  acetaminophen   IVPB .. 1000 milliGRAM(s) IV Intermittent every 6 hours  finasteride 5 milliGRAM(s) Oral daily  heparin   Injectable 5000 Unit(s) SubCutaneous every 8 hours  lactated ringers. 1000 milliLiter(s) (100 mL/Hr) IV Continuous <Continuous>  piperacillin/tazobactam IVPB.. 3.375 Gram(s) IV Intermittent every 8 hours    MEDICATIONS  (PRN):  oxyCODONE    IR 5 milliGRAM(s) Oral every 4 hours PRN Severe Pain (7 - 10)  oxyCODONE    IR 2.5 milliGRAM(s) Oral every 4 hours PRN Moderate Pain (4 - 6)      PHYSICAL EXAM:  Constitutional: A&Ox3, NAD  Respiratory: Unlabored breathing  Abdomen: nontender, nondistended  Extremities: WWP, CALERO spontaneously    LABS:                        13.3   11.42 )-----------( 167      ( 05 Aug 2022 17:11 )             39.8     08-05    134<L>  |  97  |  22  ----------------------------<  126<H>  3.6   |  24  |  1.45<H>    Ca    9.5      05 Aug 2022 17:11    TPro  7.9  /  Alb  4.0  /  TBili  0.7  /  DBili  x   /  AST  30  /  ALT  33  /  AlkPhos  114  08-05    PT/INR - ( 05 Aug 2022 17:11 )   PT: 15.7 sec;   INR: 1.35 ratio         PTT - ( 05 Aug 2022 17:11 )  PTT:33.5 sec  LIVER FUNCTIONS - ( 05 Aug 2022 17:11 )  Alb: 4.0 g/dL / Pro: 7.9 g/dL / ALK PHOS: 114 U/L / ALT: 33 U/L / AST: 30 U/L / GGT: x           Urinalysis Basic - ( 05 Aug 2022 21:57 )    Color: Yellow / Appearance: Clear / SG: >1.050 / pH: x  Gluc: x / Ketone: Negative  / Bili: Negative / Urobili: Negative   Blood: x / Protein: 30 mg/dL / Nitrite: Negative   Leuk Esterase: Negative / RBC: 2 /hpf / WBC 2 /HPF   Sq Epi: x / Non Sq Epi: 0 /hpf / Bacteria: Negative      ABO Interpretation: A (08-05-22 @ 18:14)  ABO Interpretation: A (08-05-22 @ 17:19)      IMAGING:

## 2022-08-07 NOTE — PROGRESS NOTE ADULT - ASSESSMENT
Patient is a 71yo M w/ hx of HTN, BPH, kidney stones that presents with  waxing and waning fatigue for approximately 3-4 weeks, loss of appetite and 10-15 unintentional weight loss. Over the past 24 hrs, he developed RLQ abdominal pain, fevers and chills. Labs remarkable for leukocytosis to 11, and CT scan consistent with perforated appendicitis.       Plan:    - Monitor for fevers, F/u CBC, fever work up (blood cultures pending)  - NPO/IVF  - C/w Zosyn  - Pain control prn  - on home meds   - DVT ppx         [facility]  [id number]    Patient is a 71yo M w/ hx of HTN, BPH, kidney stones that presents with  waxing and waning fatigue for approximately 3-4 weeks, loss of appetite and 10-15 unintentional weight loss. Over the past 24 hrs, he developed RLQ abdominal pain, fevers and chills. Labs remarkable for leukocytosis to 11, and CT scan consistent with perforated appendicitis.       Plan:    - Patient has appetite, advance diet to CLD  - Monitor for fevers, F/u CBC, fever work up (blood cultures pending)  - NPO/IVF  - C/w Zosyn  - Pain control prn  - on home meds   - DVT ppx         [facility]  [id number]

## 2022-08-08 LAB
ANION GAP SERPL CALC-SCNC: 7 MMOL/L — SIGNIFICANT CHANGE UP (ref 5–17)
BUN SERPL-MCNC: 12 MG/DL — SIGNIFICANT CHANGE UP (ref 7–23)
CALCIUM SERPL-MCNC: 8.1 MG/DL — LOW (ref 8.4–10.5)
CHLORIDE SERPL-SCNC: 102 MMOL/L — SIGNIFICANT CHANGE UP (ref 96–108)
CO2 SERPL-SCNC: 26 MMOL/L — SIGNIFICANT CHANGE UP (ref 22–31)
CREAT SERPL-MCNC: 1.14 MG/DL — SIGNIFICANT CHANGE UP (ref 0.5–1.3)
EGFR: 69 ML/MIN/1.73M2 — SIGNIFICANT CHANGE UP
GLUCOSE SERPL-MCNC: 97 MG/DL — SIGNIFICANT CHANGE UP (ref 70–99)
HCT VFR BLD CALC: 31.5 % — LOW (ref 39–50)
HGB BLD-MCNC: 10.3 G/DL — LOW (ref 13–17)
MCHC RBC-ENTMCNC: 31 PG — SIGNIFICANT CHANGE UP (ref 27–34)
MCHC RBC-ENTMCNC: 32.7 GM/DL — SIGNIFICANT CHANGE UP (ref 32–36)
MCV RBC AUTO: 94.9 FL — SIGNIFICANT CHANGE UP (ref 80–100)
NRBC # BLD: 0 /100 WBCS — SIGNIFICANT CHANGE UP (ref 0–0)
PLATELET # BLD AUTO: 121 K/UL — LOW (ref 150–400)
POTASSIUM SERPL-MCNC: 3.8 MMOL/L — SIGNIFICANT CHANGE UP (ref 3.5–5.3)
POTASSIUM SERPL-SCNC: 3.8 MMOL/L — SIGNIFICANT CHANGE UP (ref 3.5–5.3)
RBC # BLD: 3.32 M/UL — LOW (ref 4.2–5.8)
RBC # FLD: 12.9 % — SIGNIFICANT CHANGE UP (ref 10.3–14.5)
SODIUM SERPL-SCNC: 135 MMOL/L — SIGNIFICANT CHANGE UP (ref 135–145)
WBC # BLD: 8.59 K/UL — SIGNIFICANT CHANGE UP (ref 3.8–10.5)
WBC # FLD AUTO: 8.59 K/UL — SIGNIFICANT CHANGE UP (ref 3.8–10.5)

## 2022-08-08 PROCEDURE — 99222 1ST HOSP IP/OBS MODERATE 55: CPT

## 2022-08-08 RX ORDER — POTASSIUM PHOSPHATE, MONOBASIC POTASSIUM PHOSPHATE, DIBASIC 236; 224 MG/ML; MG/ML
15 INJECTION, SOLUTION INTRAVENOUS ONCE
Refills: 0 | Status: COMPLETED | OUTPATIENT
Start: 2022-08-08 | End: 2022-08-08

## 2022-08-08 RX ORDER — ERTAPENEM SODIUM 1 G/1
1000 INJECTION, POWDER, LYOPHILIZED, FOR SOLUTION INTRAMUSCULAR; INTRAVENOUS ONCE
Refills: 0 | Status: COMPLETED | OUTPATIENT
Start: 2022-08-08 | End: 2022-08-08

## 2022-08-08 RX ORDER — ERTAPENEM SODIUM 1 G/1
1000 INJECTION, POWDER, LYOPHILIZED, FOR SOLUTION INTRAMUSCULAR; INTRAVENOUS EVERY 24 HOURS
Refills: 0 | Status: COMPLETED | OUTPATIENT
Start: 2022-08-09 | End: 2022-08-15

## 2022-08-08 RX ORDER — ERTAPENEM SODIUM 1 G/1
1 INJECTION, POWDER, LYOPHILIZED, FOR SOLUTION INTRAMUSCULAR; INTRAVENOUS
Qty: 14 | Refills: 0
Start: 2022-08-08 | End: 2022-08-21

## 2022-08-08 RX ORDER — ERTAPENEM SODIUM 1 G/1
INJECTION, POWDER, LYOPHILIZED, FOR SOLUTION INTRAMUSCULAR; INTRAVENOUS
Refills: 0 | Status: COMPLETED | OUTPATIENT
Start: 2022-08-08 | End: 2022-08-16

## 2022-08-08 RX ADMIN — Medication 975 MILLIGRAM(S): at 19:23

## 2022-08-08 RX ADMIN — Medication 975 MILLIGRAM(S): at 01:00

## 2022-08-08 RX ADMIN — SODIUM CHLORIDE 100 MILLILITER(S): 9 INJECTION, SOLUTION INTRAVENOUS at 14:28

## 2022-08-08 RX ADMIN — Medication 975 MILLIGRAM(S): at 00:00

## 2022-08-08 RX ADMIN — ERTAPENEM SODIUM 120 MILLIGRAM(S): 1 INJECTION, POWDER, LYOPHILIZED, FOR SOLUTION INTRAMUSCULAR; INTRAVENOUS at 16:13

## 2022-08-08 RX ADMIN — HEPARIN SODIUM 5000 UNIT(S): 5000 INJECTION INTRAVENOUS; SUBCUTANEOUS at 05:35

## 2022-08-08 RX ADMIN — Medication 975 MILLIGRAM(S): at 06:23

## 2022-08-08 RX ADMIN — Medication 975 MILLIGRAM(S): at 14:25

## 2022-08-08 RX ADMIN — HEPARIN SODIUM 5000 UNIT(S): 5000 INJECTION INTRAVENOUS; SUBCUTANEOUS at 14:24

## 2022-08-08 RX ADMIN — HEPARIN SODIUM 5000 UNIT(S): 5000 INJECTION INTRAVENOUS; SUBCUTANEOUS at 21:08

## 2022-08-08 RX ADMIN — Medication 975 MILLIGRAM(S): at 05:27

## 2022-08-08 RX ADMIN — POTASSIUM PHOSPHATE, MONOBASIC POTASSIUM PHOSPHATE, DIBASIC 62.5 MILLIMOLE(S): 236; 224 INJECTION, SOLUTION INTRAVENOUS at 16:16

## 2022-08-08 RX ADMIN — FINASTERIDE 5 MILLIGRAM(S): 5 TABLET, FILM COATED ORAL at 14:25

## 2022-08-08 RX ADMIN — PIPERACILLIN AND TAZOBACTAM 25 GRAM(S): 4; .5 INJECTION, POWDER, LYOPHILIZED, FOR SOLUTION INTRAVENOUS at 05:28

## 2022-08-08 NOTE — PROGRESS NOTE ADULT - SUBJECTIVE AND OBJECTIVE BOX
Red team Surgery Progress Note    INTERVAL EVENTS:   No acute events overnight.    SUBJECTIVE: Patient seen and examined at bedside with surgical team,     OBJECTIVE:    Vital Signs Last 24 Hrs  T(C): 38.1 (08 Aug 2022 00:01), Max: 38.1 (08 Aug 2022 00:01)  T(F): 100.5 (08 Aug 2022 00:01), Max: 100.5 (08 Aug 2022 00:01)  HR: 75 (07 Aug 2022 21:15) (70 - 77)  BP: 122/75 (07 Aug 2022 21:15) (107/61 - 131/79)  BP(mean): --  RR: 18 (07 Aug 2022 21:15) (18 - 18)  SpO2: 98% (07 Aug 2022 21:15) (95% - 98%)    Parameters below as of 07 Aug 2022 21:15  Patient On (Oxygen Delivery Method): room air    I&O's Detail    06 Aug 2022 07:01  -  07 Aug 2022 07:00  --------------------------------------------------------  IN:    IV PiggyBack: 700 mL    IV PiggyBack: 100 mL    Lactated Ringers: 2400 mL    Lactated Ringers Bolus: 500 mL  Total IN: 3700 mL    OUT:    Voided (mL): 1050 mL  Total OUT: 1050 mL    Total NET: 2650 mL      07 Aug 2022 07:01  -  08 Aug 2022 02:56  --------------------------------------------------------  IN:    IV PiggyBack: 100 mL    IV PiggyBack: 200 mL    Oral Fluid: 360 mL  Total IN: 660 mL    OUT:    Voided (mL): 350 mL  Total OUT: 350 mL    Total NET: 310 mL      MEDICATIONS  (STANDING):  acetaminophen     Tablet .. 975 milliGRAM(s) Oral every 6 hours  finasteride 5 milliGRAM(s) Oral daily  heparin   Injectable 5000 Unit(s) SubCutaneous every 8 hours  lactated ringers. 1000 milliLiter(s) (100 mL/Hr) IV Continuous <Continuous>  piperacillin/tazobactam IVPB.. 3.375 Gram(s) IV Intermittent every 8 hours    MEDICATIONS  (PRN):  oxyCODONE    IR 5 milliGRAM(s) Oral every 4 hours PRN Severe Pain (7 - 10)  oxyCODONE    IR 2.5 milliGRAM(s) Oral every 4 hours PRN Moderate Pain (4 - 6)      PHYSICAL EXAM:  Constitutional: A&Ox3, NAD  Respiratory: Unlabored breathing  Abdomen:??????  Extremities: WWP, CALERO spontaneously    LABS:                        11.4   7.12  )-----------( 118      ( 07 Aug 2022 07:30 )             34.1     08-07    137  |  102  |  17  ----------------------------<  86  3.7   |  23  |  1.26    Ca    8.3<L>      07 Aug 2022 07:32  Phos  2.8     08-07  Mg     2.0     08-07                IMAGING:     Red team Surgery Progress Note    INTERVAL EVENTS:   No acute events overnight. One time febrile episode of 100.5 that has resolved.    SUBJECTIVE: Patient seen and examined at bedside with surgical team. Pt denies n/v, bowel function (+/+).    OBJECTIVE:    Vital Signs Last 24 Hrs  T(C): 38.1 (08 Aug 2022 00:01), Max: 38.1 (08 Aug 2022 00:01)  T(F): 100.5 (08 Aug 2022 00:01), Max: 100.5 (08 Aug 2022 00:01)  HR: 75 (07 Aug 2022 21:15) (70 - 77)  BP: 122/75 (07 Aug 2022 21:15) (107/61 - 131/79)  BP(mean): --  RR: 18 (07 Aug 2022 21:15) (18 - 18)  SpO2: 98% (07 Aug 2022 21:15) (95% - 98%)    Parameters below as of 07 Aug 2022 21:15  Patient On (Oxygen Delivery Method): room air    I&O's Detail    06 Aug 2022 07:01  -  07 Aug 2022 07:00  --------------------------------------------------------  IN:    IV PiggyBack: 700 mL    IV PiggyBack: 100 mL    Lactated Ringers: 2400 mL    Lactated Ringers Bolus: 500 mL  Total IN: 3700 mL    OUT:    Voided (mL): 1050 mL  Total OUT: 1050 mL    Total NET: 2650 mL      07 Aug 2022 07:01  -  08 Aug 2022 02:56  --------------------------------------------------------  IN:    IV PiggyBack: 100 mL    IV PiggyBack: 200 mL    Oral Fluid: 360 mL  Total IN: 660 mL    OUT:    Voided (mL): 350 mL  Total OUT: 350 mL    Total NET: 310 mL      MEDICATIONS  (STANDING):  acetaminophen     Tablet .. 975 milliGRAM(s) Oral every 6 hours  finasteride 5 milliGRAM(s) Oral daily  heparin   Injectable 5000 Unit(s) SubCutaneous every 8 hours  lactated ringers. 1000 milliLiter(s) (100 mL/Hr) IV Continuous <Continuous>  piperacillin/tazobactam IVPB.. 3.375 Gram(s) IV Intermittent every 8 hours    MEDICATIONS  (PRN):  oxyCODONE    IR 5 milliGRAM(s) Oral every 4 hours PRN Severe Pain (7 - 10)  oxyCODONE    IR 2.5 milliGRAM(s) Oral every 4 hours PRN Moderate Pain (4 - 6)      PHYSICAL EXAM:  Constitutional: A&Ox3, NAD  Respiratory: Unlabored breathing  Abdomen: NT, ND.  Extremities: WWP, CALERO spontaneously    LABS:                        11.4   7.12  )-----------( 118      ( 07 Aug 2022 07:30 )             34.1     08-07    137  |  102  |  17  ----------------------------<  86  3.7   |  23  |  1.26    Ca    8.3<L>      07 Aug 2022 07:32  Phos  2.8     08-07  Mg     2.0     08-07                IMAGING:

## 2022-08-08 NOTE — PROGRESS NOTE ADULT - ASSESSMENT
Patient is a 71yo M w/ hx of HTN, BPH, kidney stones that presents with  waxing and waning fatigue for approximately 3-4 weeks, loss of appetite and 10-15 unintentional weight loss. Over the past 24 hrs, he developed RLQ abdominal pain, fevers and chills. Labs remarkable for leukocytosis to 11, and CT scan consistent with perforated appendicitis.       Plan:    - LRD  - C/w Zosyn  - Pain control prn  - on home meds   - DVT ppx         Red Surgery  [id number] Patient is a 73yo M w/ hx of HTN, BPH, kidney stones that presents with  waxing and waning fatigue for approximately 3-4 weeks, loss of appetite and 10-15 unintentional weight loss. Over the past 24 hrs, he developed RLQ abdominal pain, fevers and chills. Labs remarkable for leukocytosis to 11, and CT scan consistent with perforated appendicitis.       Plan:    - LRD  - C/w Zosyn; monitor fever  - Pain control prn  - on home meds   - DVT ppx         Red Surgery  [id number]

## 2022-08-09 ENCOUNTER — TRANSCRIPTION ENCOUNTER (OUTPATIENT)
Age: 72
End: 2022-08-09

## 2022-08-09 LAB
ANION GAP SERPL CALC-SCNC: 9 MMOL/L — SIGNIFICANT CHANGE UP (ref 5–17)
BUN SERPL-MCNC: 11 MG/DL — SIGNIFICANT CHANGE UP (ref 7–23)
CALCIUM SERPL-MCNC: 8.2 MG/DL — LOW (ref 8.4–10.5)
CHLORIDE SERPL-SCNC: 103 MMOL/L — SIGNIFICANT CHANGE UP (ref 96–108)
CO2 SERPL-SCNC: 24 MMOL/L — SIGNIFICANT CHANGE UP (ref 22–31)
CREAT SERPL-MCNC: 1 MG/DL — SIGNIFICANT CHANGE UP (ref 0.5–1.3)
EGFR: 80 ML/MIN/1.73M2 — SIGNIFICANT CHANGE UP
GLUCOSE SERPL-MCNC: 97 MG/DL — SIGNIFICANT CHANGE UP (ref 70–99)
HCT VFR BLD CALC: 30.7 % — LOW (ref 39–50)
HGB BLD-MCNC: 10.2 G/DL — LOW (ref 13–17)
MAGNESIUM SERPL-MCNC: 1.8 MG/DL — SIGNIFICANT CHANGE UP (ref 1.6–2.6)
MCHC RBC-ENTMCNC: 31 PG — SIGNIFICANT CHANGE UP (ref 27–34)
MCHC RBC-ENTMCNC: 33.2 GM/DL — SIGNIFICANT CHANGE UP (ref 32–36)
MCV RBC AUTO: 93.3 FL — SIGNIFICANT CHANGE UP (ref 80–100)
NRBC # BLD: 0 /100 WBCS — SIGNIFICANT CHANGE UP (ref 0–0)
PHOSPHATE SERPL-MCNC: 2.3 MG/DL — LOW (ref 2.5–4.5)
PLATELET # BLD AUTO: 124 K/UL — LOW (ref 150–400)
POTASSIUM SERPL-MCNC: 3.9 MMOL/L — SIGNIFICANT CHANGE UP (ref 3.5–5.3)
POTASSIUM SERPL-SCNC: 3.9 MMOL/L — SIGNIFICANT CHANGE UP (ref 3.5–5.3)
RBC # BLD: 3.29 M/UL — LOW (ref 4.2–5.8)
RBC # FLD: 13.2 % — SIGNIFICANT CHANGE UP (ref 10.3–14.5)
SODIUM SERPL-SCNC: 136 MMOL/L — SIGNIFICANT CHANGE UP (ref 135–145)
WBC # BLD: 8.32 K/UL — SIGNIFICANT CHANGE UP (ref 3.8–10.5)
WBC # FLD AUTO: 8.32 K/UL — SIGNIFICANT CHANGE UP (ref 3.8–10.5)

## 2022-08-09 PROCEDURE — 99232 SBSQ HOSP IP/OBS MODERATE 35: CPT

## 2022-08-09 RX ORDER — SODIUM,POTASSIUM PHOSPHATES 278-250MG
2 POWDER IN PACKET (EA) ORAL ONCE
Refills: 0 | Status: COMPLETED | OUTPATIENT
Start: 2022-08-09 | End: 2022-08-09

## 2022-08-09 RX ADMIN — Medication 975 MILLIGRAM(S): at 13:10

## 2022-08-09 RX ADMIN — SODIUM CHLORIDE 100 MILLILITER(S): 9 INJECTION, SOLUTION INTRAVENOUS at 05:45

## 2022-08-09 RX ADMIN — Medication 975 MILLIGRAM(S): at 12:39

## 2022-08-09 RX ADMIN — ERTAPENEM SODIUM 120 MILLIGRAM(S): 1 INJECTION, POWDER, LYOPHILIZED, FOR SOLUTION INTRAMUSCULAR; INTRAVENOUS at 12:40

## 2022-08-09 RX ADMIN — FINASTERIDE 5 MILLIGRAM(S): 5 TABLET, FILM COATED ORAL at 12:39

## 2022-08-09 RX ADMIN — Medication 975 MILLIGRAM(S): at 05:44

## 2022-08-09 RX ADMIN — Medication 975 MILLIGRAM(S): at 18:27

## 2022-08-09 RX ADMIN — HEPARIN SODIUM 5000 UNIT(S): 5000 INJECTION INTRAVENOUS; SUBCUTANEOUS at 21:17

## 2022-08-09 RX ADMIN — HEPARIN SODIUM 5000 UNIT(S): 5000 INJECTION INTRAVENOUS; SUBCUTANEOUS at 05:43

## 2022-08-09 RX ADMIN — HEPARIN SODIUM 5000 UNIT(S): 5000 INJECTION INTRAVENOUS; SUBCUTANEOUS at 15:09

## 2022-08-09 RX ADMIN — Medication 2 TABLET(S): at 12:39

## 2022-08-09 NOTE — PROGRESS NOTE ADULT - ASSESSMENT
72 year old with symptoms for almost a month with pain, wt loss , anorexia admitted with missed AP.  Feels better since admission  but still bloated and not eating well  No localized abscess to drain.    non toxic.   discussed with pt and his wife all options  safest approach would be midline and 10- 14 days of Invanz with elective AP in the future  His exam is benign and he is eating well and feels well

## 2022-08-09 NOTE — DISCHARGE NOTE PROVIDER - PROVIDER TOKENS
Render Post-Care Instructions In Note?: no Detail Level: Simple Post-Care Instructions: I reviewed with the patient in detail post-care instructions. Patient is to wear sunprotection, and avoid picking at any of the treated lesions. Pt may apply Vaseline to crusted or scabbing areas. Consent: The patient's consent was obtained including but not limited to risks of crusting, scabbing, blistering, scarring, darker or lighter pigmentary change, recurrence, incomplete removal and infection. The patient understands that the procedure is cosmetic in nature and is not covered by insurance. PROVIDER:[TOKEN:[[id number]]],PROVIDER:[TOKEN:[[id number]]] PROVIDER:[TOKEN:[[id number]],FOLLOWUP:[1-3 days]],PROVIDER:[TOKEN:[[id number]],FOLLOWUP:[1 week]]

## 2022-08-09 NOTE — DISCHARGE NOTE PROVIDER - NSDCCPCAREPLAN_GEN_ALL_CORE_FT
PRINCIPAL DISCHARGE DIAGNOSIS  Diagnosis: Perforated appendicitis  Assessment and Plan of Treatment: Invanz 1 gram IV daily via midline through 8/21   weekly labs CBC, CMP fax results to infectious disease Dr. Bell [phone]   follow up with        PRINCIPAL DISCHARGE DIAGNOSIS  Diagnosis: Perforated appendicitis  Assessment and Plan of Treatment: Invanz 1 gram IV daily via midline through 8/21   weekly labs CBC, CMP fax results to infectious disease Dr. Bell [phone]   follow up with       SECONDARY DISCHARGE DIAGNOSES  Diagnosis: Enlarged prostate  Assessment and Plan of Treatment: Please follow up with your regular medical doctor in 1 week, please call to schedule an appointment to discuss recent hospitalization, check your PSA level        PRINCIPAL DISCHARGE DIAGNOSIS  Diagnosis: Perforated appendicitis  Assessment and Plan of Treatment: - Notify your surgeon and return to ER for temperatures greater than 101, chills sweats, pain not controlled with pain medications, persistent nausea and vomiting, inability to tolerate food, significant abdominal bloating/distension, no passing of flatus or bowel movements, or acutely concerning matters to you, that may require urgent medical attention.   -Invanz 1 gram IV daily via midline through 8/21   -weekly labs CBC, CMP fax results to infectious disease Dr. Bell [phone]   -follow up with Dr. Kilpatrick next Thursday in office, please call to make an appointment and follow up with Dr. Bell within 2 week upon discharge.      SECONDARY DISCHARGE DIAGNOSES  Diagnosis: Enlarged prostate  Assessment and Plan of Treatment: Please follow up with your regular medical doctor in 1 week, please call to schedule an appointment to discuss recent hospitalization, check your PSA level        PRINCIPAL DISCHARGE DIAGNOSIS  Diagnosis: Perforated appendicitis  Assessment and Plan of Treatment: - Notify Dr. Kilpatrick and return to ER for temperatures greater than 101, chills sweats, pain not controlled with pain medications, persistent nausea and vomiting, inability to tolerate food, significant abdominal bloating/distension, no passing of flatus or bowel movements, or acutely concerning matters to you, that may require urgent medical attention.   -Invanz 1 gram IV daily via midline through 8/21   -weekly labs CBC, CMP fax results to infectious disease Dr. Bell [phone]   -follow up with Dr. Kilpatrick next Thursday in office, please call to make an appointment and follow up with Dr. Bell within 2 week upon discharge.      SECONDARY DISCHARGE DIAGNOSES  Diagnosis: Enlarged prostate  Assessment and Plan of Treatment: Please follow up with your regular medical doctor in 1 week, please call to schedule an appointment to discuss recent hospitalization, check your PSA level

## 2022-08-09 NOTE — PROGRESS NOTE ADULT - SUBJECTIVE AND OBJECTIVE BOX
Red team Surgery Progress Note    INTERVAL EVENTS:   No acute events overnight.    SUBJECTIVE: Patient seen and examined at bedside with surgical team,     OBJECTIVE:    Vital Signs Last 24 Hrs  T(C): 37 (09 Aug 2022 00:42), Max: 37.3 (08 Aug 2022 16:38)  T(F): 98.6 (09 Aug 2022 00:42), Max: 99.2 (08 Aug 2022 16:38)  HR: 64 (09 Aug 2022 00:42) (63 - 78)  BP: 117/68 (09 Aug 2022 00:42) (102/60 - 144/69)  BP(mean): --  RR: 18 (09 Aug 2022 00:42) (18 - 18)  SpO2: 96% (09 Aug 2022 00:42) (94% - 96%)    Parameters below as of 09 Aug 2022 00:42  Patient On (Oxygen Delivery Method): room air    I&O's Detail    07 Aug 2022 07:01  -  08 Aug 2022 07:00  --------------------------------------------------------  IN:    IV PiggyBack: 100 mL    IV PiggyBack: 200 mL    Lactated Ringers: 1200 mL    Oral Fluid: 360 mL  Total IN: 1860 mL    OUT:    Voided (mL): 700 mL  Total OUT: 700 mL    Total NET: 1160 mL      08 Aug 2022 07:01  -  09 Aug 2022 02:44  --------------------------------------------------------  IN:    Oral Fluid: 640 mL  Total IN: 640 mL    OUT:    Voided (mL): 475 mL  Total OUT: 475 mL    Total NET: 165 mL      MEDICATIONS  (STANDING):  acetaminophen     Tablet .. 975 milliGRAM(s) Oral every 6 hours  ertapenem  IVPB      ertapenem  IVPB 1000 milliGRAM(s) IV Intermittent every 24 hours  finasteride 5 milliGRAM(s) Oral daily  heparin   Injectable 5000 Unit(s) SubCutaneous every 8 hours  lactated ringers. 1000 milliLiter(s) (100 mL/Hr) IV Continuous <Continuous>    MEDICATIONS  (PRN):  oxyCODONE    IR 5 milliGRAM(s) Oral every 4 hours PRN Severe Pain (7 - 10)  oxyCODONE    IR 2.5 milliGRAM(s) Oral every 4 hours PRN Moderate Pain (4 - 6)      PHYSICAL EXAM:  Constitutional: A&Ox3, NAD  Respiratory: Unlabored breathing  Abdomen: S?????  Extremities: WWP, CALERO spontaneously    LABS:                        10.3   8.59  )-----------( 121      ( 08 Aug 2022 07:04 )             31.5     08-08    135  |  102  |  12  ----------------------------<  97  3.8   |  26  |  1.14    Ca    8.1<L>      08 Aug 2022 07:02  Phos  2.8     08-07  Mg     2.0     08-07                IMAGING:     Red team Surgery Progress Note    INTERVAL EVENTS:   No acute events overnight.    SUBJECTIVE: Patient seen and examined at bedside with surgical team, patient had midline placed yesterday. Reports pain improving, marco small bites of regular diet, nausea that resolved on its own earlier this morning, no vomiting.  (+/+)    OBJECTIVE:    Vital Signs Last 24 Hrs  T(C): 37 (09 Aug 2022 00:42), Max: 37.3 (08 Aug 2022 16:38)  T(F): 98.6 (09 Aug 2022 00:42), Max: 99.2 (08 Aug 2022 16:38)  HR: 64 (09 Aug 2022 00:42) (63 - 78)  BP: 117/68 (09 Aug 2022 00:42) (102/60 - 144/69)  BP(mean): --  RR: 18 (09 Aug 2022 00:42) (18 - 18)  SpO2: 96% (09 Aug 2022 00:42) (94% - 96%)    Parameters below as of 09 Aug 2022 00:42  Patient On (Oxygen Delivery Method): room air    I&O's Detail    07 Aug 2022 07:01  -  08 Aug 2022 07:00  --------------------------------------------------------  IN:    IV PiggyBack: 100 mL    IV PiggyBack: 200 mL    Lactated Ringers: 1200 mL    Oral Fluid: 360 mL  Total IN: 1860 mL    OUT:    Voided (mL): 700 mL  Total OUT: 700 mL    Total NET: 1160 mL      08 Aug 2022 07:01  -  09 Aug 2022 02:44  --------------------------------------------------------  IN:    Oral Fluid: 640 mL  Total IN: 640 mL    OUT:    Voided (mL): 475 mL  Total OUT: 475 mL    Total NET: 165 mL      MEDICATIONS  (STANDING):  acetaminophen     Tablet .. 975 milliGRAM(s) Oral every 6 hours  ertapenem  IVPB      ertapenem  IVPB 1000 milliGRAM(s) IV Intermittent every 24 hours  finasteride 5 milliGRAM(s) Oral daily  heparin   Injectable 5000 Unit(s) SubCutaneous every 8 hours  lactated ringers. 1000 milliLiter(s) (100 mL/Hr) IV Continuous <Continuous>    MEDICATIONS  (PRN):  oxyCODONE    IR 5 milliGRAM(s) Oral every 4 hours PRN Severe Pain (7 - 10)  oxyCODONE    IR 2.5 milliGRAM(s) Oral every 4 hours PRN Moderate Pain (4 - 6)      PHYSICAL EXAM:  Constitutional: A&Ox3, NAD  Respiratory: Unlabored breathing  Abdomen: soft, NT, ND  Extremities: WWP, CALERO spontaneously    LABS:                        10.3   8.59  )-----------( 121      ( 08 Aug 2022 07:04 )             31.5     08-08    135  |  102  |  12  ----------------------------<  97  3.8   |  26  |  1.14    Ca    8.1<L>      08 Aug 2022 07:02  Phos  2.8     08-07  Mg     2.0     08-07                IMAGING:

## 2022-08-09 NOTE — PROGRESS NOTE ADULT - ASSESSMENT
Patient is a 73yo M w/ hx of HTN, BPH, kidney stones that presents with  waxing and waning fatigue for approximately 3-4 weeks, loss of appetite and 10-15 unintentional weight loss. Over the past 24 hrs, he developed RLQ abdominal pain, fevers and chills. Labs remarkable for leukocytosis to 11, and CT scan consistent with perforated appendicitis.       Plan:    - LRD  - C/w Zosyn; monitor fever  - Pain control prn  - on home meds   - DVT ppx         Red Surgery  [id number] Patient is a 73yo M w/ hx of HTN, BPH, kidney stones that presents with  waxing and waning fatigue for approximately 3-4 weeks, loss of appetite and 10-15 unintentional weight loss. Over the past 24 hrs, he developed RLQ abdominal pain, fevers and chills. Labs remarkable for leukocytosis to 11, and CT scan consistent with perforated appendicitis.       Plan:  - ID consulted: Zosyn changed to Invanz  - midline in place  - LRD  - monitor for fevers  - Pain control prn  - on home meds   - DVT ppx   - AM labs  - Dispo: dc home with VNS for iv invanz sukumar        Red Surgery  [id number]

## 2022-08-09 NOTE — PROGRESS NOTE ADULT - SUBJECTIVE AND OBJECTIVE BOX
infectious diseases progress note:    Patient is a 72y old  Male who presents with a chief complaint of abdominal pain (09 Aug 2022 09:06)        Acute appendicitis with perforation and localized peritonitis, without abscess               Allergies    No Known Allergies    Intolerances        ANTIBIOTICS/RELEVANT:  antimicrobials  ertapenem  IVPB      ertapenem  IVPB 1000 milliGRAM(s) IV Intermittent every 24 hours    immunologic:    OTHER:  acetaminophen     Tablet .. 975 milliGRAM(s) Oral every 6 hours  finasteride 5 milliGRAM(s) Oral daily  heparin   Injectable 5000 Unit(s) SubCutaneous every 8 hours  lactated ringers. 1000 milliLiter(s) IV Continuous <Continuous>  oxyCODONE    IR 5 milliGRAM(s) Oral every 4 hours PRN  oxyCODONE    IR 2.5 milliGRAM(s) Oral every 4 hours PRN  potassium phosphate / sodium phosphate Tablet (K-PHOS No. 2) 2 Tablet(s) Oral once      Objective:  Vital Signs Last 24 Hrs  T(C): 37.4 (09 Aug 2022 05:45), Max: 37.4 (09 Aug 2022 05:45)  T(F): 99.4 (09 Aug 2022 05:45), Max: 99.4 (09 Aug 2022 05:45)  HR: 72 (09 Aug 2022 05:45) (64 - 78)  BP: 127/70 (09 Aug 2022 05:45) (110/59 - 144/69)  BP(mean): --  RR: 18 (09 Aug 2022 05:45) (18 - 18)  SpO2: 95% (09 Aug 2022 05:45) (94% - 96%)    Parameters below as of 09 Aug 2022 05:45  Patient On (Oxygen Delivery Method): room air         Eyes:GLORIA, EOMI  Ear/Nose/Throat: no oral lesion, no sinus tenderness on percussion	  Neck:no JVD, no lymphadenopathy, supple  Respiratory: CTA damion  Cardiovascular: S1S2 RRR, no murmurs  Gastrointestinal:soft, (+) BS, no HSM  Extremities:no e/e/c        LABS:                        10.2   8.32  )-----------( 124      ( 09 Aug 2022 07:10 )             30.7     08-09    136  |  103  |  11  ----------------------------<  97  3.9   |  24  |  1.00    Ca    8.2<L>      09 Aug 2022 07:10  Phos  2.3     08-09  Mg     1.8     08-09              MICROBIOLOGY:    RECENT CULTURES:  08-05 @ 21:58 Clean Catch Clean Catch (Midstream)                No growth    08-05 @ 17:00 .Blood Blood-Peripheral                No growth to date.    08-05 @ 16:45 .Blood Blood-Peripheral                No growth to date.          RESPIRATORY CULTURES:              RADIOLOGY & ADDITIONAL STUDIES:        Pager [phone]  After 5 pm/weekends or if no response :[phone]

## 2022-08-09 NOTE — DISCHARGE NOTE PROVIDER - CARE PROVIDERS DIRECT ADDRESSES
[email or web address]

## 2022-08-09 NOTE — DISCHARGE NOTE PROVIDER - NSDCMRMEDTOKEN_GEN_ALL_CORE_FT
finasteride 5 mg oral tablet: 1 tab(s) orally once a day  hydroCHLOROthiazide 12.5 mg oral tablet: 1 tab(s) orally once a day  INVanz 1 g injection: 1 gram(s) intravenously every 24 hours through 8/21/22 Dx: perforated appendicitis   losartan 100 mg oral tablet: 1 tab(s) orally once a day  weekly CBC, CMP fax results to Dr. Bell [phone] Dx: perforated appendicitis :    acetaminophen 325 mg oral tablet: 3 tab(s) orally every 6 hours  finasteride 5 mg oral tablet: 1 tab(s) orally once a day  hydroCHLOROthiazide 12.5 mg oral tablet: 1 tab(s) orally once a day  INVanz 1 g injection: 1 gram(s) intravenously every 24 hours through 8/21/22 Dx: perforated appendicitis   losartan 100 mg oral tablet: 1 tab(s) orally once a day  weekly CBC, CMP fax results to Dr. Bell [phone] Dx: perforated appendicitis :

## 2022-08-09 NOTE — DISCHARGE NOTE PROVIDER - CARE PROVIDER_API CALL
Ion Kilpatrick)  Surgery  [address], [address]  [place] [address]  Phone: [phone]  Fax: [phone]  Follow Up Time:     García Bell)  Infectious Disease; Internal Medicine  [address] [place] [id number]  Phone: [phone]  Fax: [phone]  Follow Up Time:    Ion Kilpatrick)  Surgery  [address] [facility], [address]  [place] [address]  Phone: [phone]  Fax: [phone]  Follow Up Time: 1-3 days    García Bell)  Infectious Disease; Internal Medicine  [address] [place] [address]  Phone: [phone]  Fax: [phone]  Follow Up Time: 1 week

## 2022-08-09 NOTE — DISCHARGE NOTE PROVIDER - HOSPITAL COURSE
71yo M w/ hx of HTN, BPH, kidney stones that presents after a CT noncontrast obtained at an outside office concerning for acute appendicitis. Patient reports waxing and waning fatigue for approximately 3-4 weeks, loss of appetite and 10-15 unintentional weight loss. Over the past 24 hrs, he developed RLQ abdominal pain, fevers and chills. No nausea or vomiting. Passing flatus and having bowel movements that sometimes are darker. Last colonoscopy was 2-3 yrs ago that per patient showed polyps that were removed.     In the ED, patient was febrile to 102.2 and tachycardic to 115. - sepsis from perforated appendicitis. He received a dose of zosyn and one 1L bolus of LR.    Repeat CT - Acute appendicitis, likely perforated.  Massively enlarged prostate gland with changes of hypertrophy. A focal   low-density lesion in the left mid gland may be related to hypertrophic   changes but is not well evaluated on this exam. Correlation with digital   rectal exam and PSA levels is advised.    Patient admitted NPO/IVF/IV Abx. Pain improved started on clears advanced to regular. Patient complaining of some cramps regular diet, monitored and improved     Patient seen by ID recommend midline , Invanz. Home care arranged for IV Abx. Patient discharged home to follow up with Dr. Kilpatrick 73yo M w/ hx of HTN, BPH, kidney stones that presents after a CT noncontrast obtained at an outside office concerning for acute appendicitis. Patient reports waxing and waning fatigue for approximately 3-4 weeks, loss of appetite and 10-15 unintentional weight loss. Over the past 24 hrs, he developed RLQ abdominal pain, fevers and chills. No nausea or vomiting. Passing flatus and having bowel movements that sometimes are darker. Last colonoscopy was 2-3 yrs ago that per patient showed polyps that were removed.     In the ED, patient was febrile to 102.2 and tachycardic to 115. - sepsis from perforated appendicitis. He received a dose of zosyn and one 1L bolus of LR.    Repeat CT - Acute appendicitis, likely perforated.  Massively enlarged prostate gland with changes of hypertrophy. A focal   low-density lesion in the left mid gland may be related to hypertrophic   changes but is not well evaluated on this exam. Correlation with digital   rectal exam and PSA levels is advised.    Patient admitted NPO/IVF/IV Abx. Pain improved started on clears advanced to regular. Patient complaining of some cramps regular diet, monitored and improved     Patient seen by ID recommend midline , Invanz. Home care arranged for IV Abx. Patient discharged home to follow up with Dr. Kilpatrick and Dr. Bell 73yo M w/ hx of HTN, BPH, kidney stones that presents after a CT noncontrast obtained at an outside office concerning for acute appendicitis. Patient reports waxing and waning fatigue for approximately 3-4 weeks, loss of appetite and 10-15 unintentional weight loss. Over the past 24 hrs, he developed RLQ abdominal pain, fevers and chills. No nausea or vomiting. Passing flatus and having bowel movements that sometimes are darker. Last colonoscopy was 2-3 yrs ago that per patient showed polyps that were removed.     In the ED, patient was febrile to 102.2 and tachycardic to 115. - sepsis from perforated appendicitis. He received a dose of zosyn and one 1L bolus of LR.    Repeat CT - Acute appendicitis, likely perforated.  Massively enlarged prostate gland with changes of hypertrophy. A focal   low-density lesion in the left mid gland may be related to hypertrophic   changes but is not well evaluated on this exam. Correlation with digital   rectal exam and PSA levels is advised.    Patient admitted NPO/IVF/IV Abx. Pain improved started on clears advanced to regular. Patient complaining of some cramps regular diet, monitored and improved     Patient seen by ID recommend midline , Invanz. Home care arranged for IV Abx.   Patient with persistent vague pain Repeat CT scan 8/10- Redemonstrated appendicitis with signs of local perforation. The appendix and adjacent fluid are slightly increased in size since 8/5/2022.  patient monitored on IV abx pain improved   Patient discharged home to follow up with Dr. Kilpatrick and Dr. Bell 71yo M w/ hx of HTN, BPH, kidney stones that presents after a CT noncontrast obtained at an outside office concerning for acute appendicitis. Patient reports waxing and waning fatigue for approximately 3-4 weeks, loss of appetite and 10-15 unintentional weight loss. Over the past 24 hrs, he developed RLQ abdominal pain, fevers and chills. No nausea or vomiting. Passing flatus and having bowel movements that sometimes are darker. Last colonoscopy was 2-3 yrs ago that per patient showed polyps that were removed.     In the ED, patient was febrile to 102.2 and tachycardic to 115. - sepsis from perforated appendicitis. He received a dose of zosyn and one 1L bolus of LR.    Repeat CT - Acute appendicitis, likely perforated.  Massively enlarged prostate gland with changes of hypertrophy. A focal   low-density lesion in the left mid gland may be related to hypertrophic   changes but is not well evaluated on this exam. Correlation with digital   rectal exam and PSA levels is advised.    Patient admitted NPO/IVF/IV Abx. Pain improved started on clears advanced to regular. Patient complaining of some cramps regular diet, monitored and improved     Patient seen by ID recommend midline , Invanz. Home care arranged for IV Abx.   Patient with persistent vague pain Repeat CT scan 8/10- Redemonstrated appendicitis with signs of local perforation. The appendix and adjacent fluid are slightly increased in size since 8/5/2022.  patient monitored on IV abx pain improved. No fever, WBC stable discussed with ID ok to discharge   Patient discharged home to follow up with Dr. Kilpatrick and Dr. Bell

## 2022-08-10 LAB
ANION GAP SERPL CALC-SCNC: 9 MMOL/L — SIGNIFICANT CHANGE UP (ref 5–17)
BUN SERPL-MCNC: 11 MG/DL — SIGNIFICANT CHANGE UP (ref 7–23)
CALCIUM SERPL-MCNC: 8.1 MG/DL — LOW (ref 8.4–10.5)
CHLORIDE SERPL-SCNC: 102 MMOL/L — SIGNIFICANT CHANGE UP (ref 96–108)
CO2 SERPL-SCNC: 24 MMOL/L — SIGNIFICANT CHANGE UP (ref 22–31)
CREAT SERPL-MCNC: 0.91 MG/DL — SIGNIFICANT CHANGE UP (ref 0.5–1.3)
EGFR: 90 ML/MIN/1.73M2 — SIGNIFICANT CHANGE UP
GLUCOSE SERPL-MCNC: 94 MG/DL — SIGNIFICANT CHANGE UP (ref 70–99)
HCT VFR BLD CALC: 30.8 % — LOW (ref 39–50)
HGB BLD-MCNC: 10.1 G/DL — LOW (ref 13–17)
MAGNESIUM SERPL-MCNC: 1.8 MG/DL — SIGNIFICANT CHANGE UP (ref 1.6–2.6)
MCHC RBC-ENTMCNC: 30.7 PG — SIGNIFICANT CHANGE UP (ref 27–34)
MCHC RBC-ENTMCNC: 32.8 GM/DL — SIGNIFICANT CHANGE UP (ref 32–36)
MCV RBC AUTO: 93.6 FL — SIGNIFICANT CHANGE UP (ref 80–100)
NRBC # BLD: 0 /100 WBCS — SIGNIFICANT CHANGE UP (ref 0–0)
PHOSPHATE SERPL-MCNC: 2.5 MG/DL — SIGNIFICANT CHANGE UP (ref 2.5–4.5)
PLATELET # BLD AUTO: 135 K/UL — LOW (ref 150–400)
POTASSIUM SERPL-MCNC: 4 MMOL/L — SIGNIFICANT CHANGE UP (ref 3.5–5.3)
POTASSIUM SERPL-SCNC: 4 MMOL/L — SIGNIFICANT CHANGE UP (ref 3.5–5.3)
RBC # BLD: 3.29 M/UL — LOW (ref 4.2–5.8)
RBC # FLD: 13.2 % — SIGNIFICANT CHANGE UP (ref 10.3–14.5)
SARS-COV-2 RNA SPEC QL NAA+PROBE: SIGNIFICANT CHANGE UP
SODIUM SERPL-SCNC: 135 MMOL/L — SIGNIFICANT CHANGE UP (ref 135–145)
WBC # BLD: 10.19 K/UL — SIGNIFICANT CHANGE UP (ref 3.8–10.5)
WBC # FLD AUTO: 10.19 K/UL — SIGNIFICANT CHANGE UP (ref 3.8–10.5)

## 2022-08-10 PROCEDURE — 99232 SBSQ HOSP IP/OBS MODERATE 35: CPT

## 2022-08-10 PROCEDURE — 74177 CT ABD & PELVIS W/CONTRAST: CPT | Mod: 26

## 2022-08-10 RX ADMIN — Medication 975 MILLIGRAM(S): at 12:58

## 2022-08-10 RX ADMIN — Medication 975 MILLIGRAM(S): at 05:59

## 2022-08-10 RX ADMIN — Medication 975 MILLIGRAM(S): at 18:21

## 2022-08-10 RX ADMIN — Medication 975 MILLIGRAM(S): at 00:20

## 2022-08-10 RX ADMIN — Medication 975 MILLIGRAM(S): at 23:58

## 2022-08-10 RX ADMIN — Medication 975 MILLIGRAM(S): at 23:28

## 2022-08-10 RX ADMIN — HEPARIN SODIUM 5000 UNIT(S): 5000 INJECTION INTRAVENOUS; SUBCUTANEOUS at 21:14

## 2022-08-10 RX ADMIN — Medication 975 MILLIGRAM(S): at 18:51

## 2022-08-10 RX ADMIN — SODIUM CHLORIDE 100 MILLILITER(S): 9 INJECTION, SOLUTION INTRAVENOUS at 18:28

## 2022-08-10 RX ADMIN — Medication 975 MILLIGRAM(S): at 00:50

## 2022-08-10 RX ADMIN — HEPARIN SODIUM 5000 UNIT(S): 5000 INJECTION INTRAVENOUS; SUBCUTANEOUS at 05:28

## 2022-08-10 RX ADMIN — Medication 975 MILLIGRAM(S): at 05:29

## 2022-08-10 RX ADMIN — Medication 975 MILLIGRAM(S): at 12:28

## 2022-08-10 RX ADMIN — FINASTERIDE 5 MILLIGRAM(S): 5 TABLET, FILM COATED ORAL at 12:28

## 2022-08-10 RX ADMIN — HEPARIN SODIUM 5000 UNIT(S): 5000 INJECTION INTRAVENOUS; SUBCUTANEOUS at 15:16

## 2022-08-10 RX ADMIN — ERTAPENEM SODIUM 120 MILLIGRAM(S): 1 INJECTION, POWDER, LYOPHILIZED, FOR SOLUTION INTRAMUSCULAR; INTRAVENOUS at 12:28

## 2022-08-10 NOTE — PROGRESS NOTE ADULT - SUBJECTIVE AND OBJECTIVE BOX
infectious diseases progress note:    Patient is a 72y old  Male who presents with a chief complaint of abdominal pain (10 Aug 2022 02:52)        Acute appendicitis with perforation and localized peritonitis, without abscess               Allergies    No Known Allergies    Intolerances        ANTIBIOTICS/RELEVANT:  antimicrobials  ertapenem  IVPB      ertapenem  IVPB 1000 milliGRAM(s) IV Intermittent every 24 hours    immunologic:    OTHER:  acetaminophen     Tablet .. 975 milliGRAM(s) Oral every 6 hours  finasteride 5 milliGRAM(s) Oral daily  heparin   Injectable 5000 Unit(s) SubCutaneous every 8 hours  lactated ringers. 1000 milliLiter(s) IV Continuous <Continuous>  oxyCODONE    IR 5 milliGRAM(s) Oral every 4 hours PRN  oxyCODONE    IR 2.5 milliGRAM(s) Oral every 4 hours PRN      Objective:  Vital Signs Last 24 Hrs  T(C): 36.4 (10 Aug 2022 08:35), Max: 37.1 (09 Aug 2022 13:00)  T(F): 97.6 (10 Aug 2022 08:35), Max: 98.8 (10 Aug 2022 00:21)  HR: 66 (10 Aug 2022 08:35) (66 - 74)  BP: 137/76 (10 Aug 2022 08:35) (126/67 - 138/76)  BP(mean): --  RR: 18 (10 Aug 2022 08:35) (18 - 18)  SpO2: 94% (10 Aug 2022 08:35) (92% - 96%)    Parameters below as of 10 Aug 2022 08:35  Patient On (Oxygen Delivery Method): room air           E         LABS:                        10.1   10.19 )-----------( 135      ( 10 Aug 2022 07:24 )             30.8     08-10    135  |  102  |  11  ----------------------------<  94  4.0   |  24  |  0.91    Ca    8.1<L>      10 Aug 2022 07:24  Phos  2.5     08-10  Mg     1.8     08-10              MICROBIOLOGY:    RECENT CULTURES:  08-05 @ 21:58 Clean Catch Clean Catch (Midstream)                No growth    08-05 @ 17:00 .Blood Blood-Peripheral                No growth to date.    08-05 @ 16:45 .Blood Blood-Peripheral                No growth to date.          RESPIRATORY CULTURES:              RADIOLOGY & ADDITIONAL STUDIES:        Pager [phone]  After 5 pm/weekends or if no response :[phone]

## 2022-08-10 NOTE — PROGRESS NOTE ADULT - ASSESSMENT
72 year old with symptoms for almost a month with pain, wt loss , anorexia admitted with missed AP.  Feels better since admission  but still bloated and not eating well  No localized abscess to drain.    non toxic.   discussed with pt and his wife all options  safest approach would be midline and 10- 14 days of Invanz with elective AP in the future  His exam is benign and he is eating well and feels well    not in room   if ct scan is stable to go home

## 2022-08-10 NOTE — PROGRESS NOTE ADULT - ASSESSMENT
Patient is a 73yo M w/ hx of HTN, BPH, kidney stones that presents with  waxing and waning fatigue for approximately 3-4 weeks, loss of appetite and 10-15 unintentional weight loss. Over the past 24 hrs, he developed RLQ abdominal pain, fevers and chills. Labs remarkable for leukocytosis to 11, and CT scan consistent with perforated appendicitis.       Plan:  - CT AP today  - NPO since midnight   - ID consulted:  Invanz  - midline in place  - monitor for fevers  - Pain control prn  - on home meds   - DVT ppx   - AM labs  - Dispo: dc home with [facility]NS for iv invanz sukumar        Red Surgery  [id number] Patient is a 73yo M w/ hx of HTN, BPH, kidney stones that presents with  waxing and waning fatigue for approximately 3-4 weeks, loss of appetite and 10-15 unintentional weight loss. Over the past 24 hrs, he developed RLQ abdominal pain, fevers and chills. Labs remarkable for leukocytosis to 11, and CT scan consistent with perforated appendicitis.       Plan:  - CT AP today scheduled 0930; f/u results w/ Rads for read  - NPO since midnight   - ID consulted:  Invanz  - midline in place  - monitor for fevers  - Pain control prn  - on home meds   - DVT ppx   - AM labs  - Dispo: dc home with VNS for iv invanz sukumar        Red Surgery  [id number]

## 2022-08-10 NOTE — PROGRESS NOTE ADULT - SUBJECTIVE AND OBJECTIVE BOX
Red team Surgery Progress Note    INTERVAL EVENTS:   No acute events overnight.    SUBJECTIVE: Patient seen and examined at bedside with surgical team,     OBJECTIVE:    Vital Signs Last 24 Hrs  T(C): 37.1 (10 Aug 2022 00:21), Max: 37.4 (09 Aug 2022 05:45)  T(F): 98.8 (10 Aug 2022 00:21), Max: 99.4 (09 Aug 2022 05:45)  HR: 74 (10 Aug 2022 00:21) (63 - 74)  BP: 135/72 (10 Aug 2022 00:21) (115/59 - 138/76)  BP(mean): --  RR: 18 (10 Aug 2022 00:21) (18 - 18)  SpO2: 92% (10 Aug 2022 00:21) (92% - 96%)    Parameters below as of 10 Aug 2022 00:21  Patient On (Oxygen Delivery Method): room air    I&O's Detail    08 Aug 2022 07:01  -  09 Aug 2022 07:00  --------------------------------------------------------  IN:    Lactated Ringers: 1200 mL    Oral Fluid: 640 mL  Total IN: 1840 mL    OUT:    Voided (mL): 775 mL  Total OUT: 775 mL    Total NET: 1065 mL      09 Aug 2022 07:01  -  10 Aug 2022 02:53  --------------------------------------------------------  IN:    IV PiggyBack: 50 mL    Lactated Ringers: 1200 mL    Oral Fluid: 820 mL  Total IN: 2070 mL    OUT:    Voided (mL): 475 mL  Total OUT: 475 mL    Total NET: 1595 mL      MEDICATIONS  (STANDING):  acetaminophen     Tablet .. 975 milliGRAM(s) Oral every 6 hours  ertapenem  IVPB      ertapenem  IVPB 1000 milliGRAM(s) IV Intermittent every 24 hours  finasteride 5 milliGRAM(s) Oral daily  heparin   Injectable 5000 Unit(s) SubCutaneous every 8 hours  lactated ringers. 1000 milliLiter(s) (100 mL/Hr) IV Continuous <Continuous>    MEDICATIONS  (PRN):  oxyCODONE    IR 5 milliGRAM(s) Oral every 4 hours PRN Severe Pain (7 - 10)  oxyCODONE    IR 2.5 milliGRAM(s) Oral every 4 hours PRN Moderate Pain (4 - 6)      PHYSICAL EXAM:  Constitutional: A&Ox3, NAD  Respiratory: Unlabored breathing  Abdomen: ????????  Extremities: WWP, CALERO spontaneously    LABS:                        10.2   8.32  )-----------( 124      ( 09 Aug 2022 07:10 )             30.7     08-09    136  |  103  |  11  ----------------------------<  97  3.9   |  24  |  1.00    Ca    8.2<L>      09 Aug 2022 07:10  Phos  2.3     08-09  Mg     1.8     08-09                IMAGING:     Red team Surgery Progress Note    INTERVAL EVENTS:   No acute events overnight.    SUBJECTIVE: Patient seen and examined at bedside with surgical team. "Manageable" pain w/ current regimen.     OBJECTIVE:    Vital Signs Last 24 Hrs  T(C): 37.1 (10 Aug 2022 00:21), Max: 37.4 (09 Aug 2022 05:45)  T(F): 98.8 (10 Aug 2022 00:21), Max: 99.4 (09 Aug 2022 05:45)  HR: 74 (10 Aug 2022 00:21) (63 - 74)  BP: 135/72 (10 Aug 2022 00:21) (115/59 - 138/76)  BP(mean): --  RR: 18 (10 Aug 2022 00:21) (18 - 18)  SpO2: 92% (10 Aug 2022 00:21) (92% - 96%)    Parameters below as of 10 Aug 2022 00:21  Patient On (Oxygen Delivery Method): room air    I&O's Detail    08 Aug 2022 07:01  -  09 Aug 2022 07:00  --------------------------------------------------------  IN:    Lactated Ringers: 1200 mL    Oral Fluid: 640 mL  Total IN: 1840 mL    OUT:    Voided (mL): 775 mL  Total OUT: 775 mL    Total NET: 1065 mL      09 Aug 2022 07:01  -  10 Aug 2022 02:53  --------------------------------------------------------  IN:    IV PiggyBack: 50 mL    Lactated Ringers: 1200 mL    Oral Fluid: 820 mL  Total IN: 2070 mL    OUT:    Voided (mL): 475 mL  Total OUT: 475 mL    Total NET: 1595 mL      MEDICATIONS  (STANDING):  acetaminophen     Tablet .. 975 milliGRAM(s) Oral every 6 hours  ertapenem  IVPB      ertapenem  IVPB 1000 milliGRAM(s) IV Intermittent every 24 hours  finasteride 5 milliGRAM(s) Oral daily  heparin   Injectable 5000 Unit(s) SubCutaneous every 8 hours  lactated ringers. 1000 milliLiter(s) (100 mL/Hr) IV Continuous <Continuous>    MEDICATIONS  (PRN):  oxyCODONE    IR 5 milliGRAM(s) Oral every 4 hours PRN Severe Pain (7 - 10)  oxyCODONE    IR 2.5 milliGRAM(s) Oral every 4 hours PRN Moderate Pain (4 - 6)      PHYSICAL EXAM:  Constitutional: A&Ox3, NAD  Respiratory: Unlabored breathing  Abdomen: ND/NT.  Extremities: WWP, CALERO spontaneously    LABS:                        10.2   8.32  )-----------( 124      ( 09 Aug 2022 07:10 )             30.7     08-09    136  |  103  |  11  ----------------------------<  97  3.9   |  24  |  1.00    Ca    8.2<L>      09 Aug 2022 07:10  Phos  2.3     08-09  Mg     1.8     08-09                IMAGING:

## 2022-08-11 DIAGNOSIS — K35.80 UNSPECIFIED ACUTE APPENDICITIS: ICD-10-CM

## 2022-08-11 DIAGNOSIS — I10 ESSENTIAL (PRIMARY) HYPERTENSION: ICD-10-CM

## 2022-08-11 LAB
ANION GAP SERPL CALC-SCNC: 12 MMOL/L — SIGNIFICANT CHANGE UP (ref 5–17)
BASOPHILS # BLD AUTO: 0.02 K/UL — SIGNIFICANT CHANGE UP (ref 0–0.2)
BASOPHILS NFR BLD AUTO: 0.2 % — SIGNIFICANT CHANGE UP (ref 0–2)
BUN SERPL-MCNC: 10 MG/DL — SIGNIFICANT CHANGE UP (ref 7–23)
CALCIUM SERPL-MCNC: 8.3 MG/DL — LOW (ref 8.4–10.5)
CHLORIDE SERPL-SCNC: 100 MMOL/L — SIGNIFICANT CHANGE UP (ref 96–108)
CO2 SERPL-SCNC: 22 MMOL/L — SIGNIFICANT CHANGE UP (ref 22–31)
CREAT SERPL-MCNC: 0.93 MG/DL — SIGNIFICANT CHANGE UP (ref 0.5–1.3)
CULTURE RESULTS: SIGNIFICANT CHANGE UP
CULTURE RESULTS: SIGNIFICANT CHANGE UP
EGFR: 88 ML/MIN/1.73M2 — SIGNIFICANT CHANGE UP
EOSINOPHIL # BLD AUTO: 0.11 K/UL — SIGNIFICANT CHANGE UP (ref 0–0.5)
EOSINOPHIL NFR BLD AUTO: 0.9 % — SIGNIFICANT CHANGE UP (ref 0–6)
GLUCOSE SERPL-MCNC: 93 MG/DL — SIGNIFICANT CHANGE UP (ref 70–99)
HCT VFR BLD CALC: 31.3 % — LOW (ref 39–50)
HGB BLD-MCNC: 10.2 G/DL — LOW (ref 13–17)
IMM GRANULOCYTES NFR BLD AUTO: 2.1 % — HIGH (ref 0–1.5)
LYMPHOCYTES # BLD AUTO: 1.04 K/UL — SIGNIFICANT CHANGE UP (ref 1–3.3)
LYMPHOCYTES # BLD AUTO: 8.9 % — LOW (ref 13–44)
MAGNESIUM SERPL-MCNC: 1.8 MG/DL — SIGNIFICANT CHANGE UP (ref 1.6–2.6)
MCHC RBC-ENTMCNC: 30.5 PG — SIGNIFICANT CHANGE UP (ref 27–34)
MCHC RBC-ENTMCNC: 32.6 GM/DL — SIGNIFICANT CHANGE UP (ref 32–36)
MCV RBC AUTO: 93.7 FL — SIGNIFICANT CHANGE UP (ref 80–100)
MONOCYTES # BLD AUTO: 0.93 K/UL — HIGH (ref 0–0.9)
MONOCYTES NFR BLD AUTO: 7.9 % — SIGNIFICANT CHANGE UP (ref 2–14)
NEUTROPHILS # BLD AUTO: 9.36 K/UL — HIGH (ref 1.8–7.4)
NEUTROPHILS NFR BLD AUTO: 80 % — HIGH (ref 43–77)
NRBC # BLD: 0 /100 WBCS — SIGNIFICANT CHANGE UP (ref 0–0)
PHOSPHATE SERPL-MCNC: 1.9 MG/DL — LOW (ref 2.5–4.5)
PLATELET # BLD AUTO: 173 K/UL — SIGNIFICANT CHANGE UP (ref 150–400)
POTASSIUM SERPL-MCNC: 3.9 MMOL/L — SIGNIFICANT CHANGE UP (ref 3.5–5.3)
POTASSIUM SERPL-SCNC: 3.9 MMOL/L — SIGNIFICANT CHANGE UP (ref 3.5–5.3)
RBC # BLD: 3.34 M/UL — LOW (ref 4.2–5.8)
RBC # FLD: 13.4 % — SIGNIFICANT CHANGE UP (ref 10.3–14.5)
SODIUM SERPL-SCNC: 134 MMOL/L — LOW (ref 135–145)
SPECIMEN SOURCE: SIGNIFICANT CHANGE UP
SPECIMEN SOURCE: SIGNIFICANT CHANGE UP
WBC # BLD: 11.7 K/UL — HIGH (ref 3.8–10.5)
WBC # FLD AUTO: 11.7 K/UL — HIGH (ref 3.8–10.5)

## 2022-08-11 PROCEDURE — 99232 SBSQ HOSP IP/OBS MODERATE 35: CPT

## 2022-08-11 RX ORDER — SODIUM CHLORIDE 9 MG/ML
1000 INJECTION, SOLUTION INTRAVENOUS
Refills: 0 | Status: DISCONTINUED | OUTPATIENT
Start: 2022-08-11 | End: 2022-08-12

## 2022-08-11 RX ORDER — CHLORHEXIDINE GLUCONATE 213 G/1000ML
1 SOLUTION TOPICAL
Refills: 0 | Status: DISCONTINUED | OUTPATIENT
Start: 2022-08-11 | End: 2022-08-15

## 2022-08-11 RX ORDER — MAGNESIUM SULFATE 500 MG/ML
1 VIAL (ML) INJECTION ONCE
Refills: 0 | Status: COMPLETED | OUTPATIENT
Start: 2022-08-11 | End: 2022-08-11

## 2022-08-11 RX ORDER — POTASSIUM PHOSPHATE, MONOBASIC POTASSIUM PHOSPHATE, DIBASIC 236; 224 MG/ML; MG/ML
15 INJECTION, SOLUTION INTRAVENOUS ONCE
Refills: 0 | Status: COMPLETED | OUTPATIENT
Start: 2022-08-11 | End: 2022-08-11

## 2022-08-11 RX ADMIN — Medication 975 MILLIGRAM(S): at 18:24

## 2022-08-11 RX ADMIN — Medication 975 MILLIGRAM(S): at 14:00

## 2022-08-11 RX ADMIN — ERTAPENEM SODIUM 120 MILLIGRAM(S): 1 INJECTION, POWDER, LYOPHILIZED, FOR SOLUTION INTRAMUSCULAR; INTRAVENOUS at 13:26

## 2022-08-11 RX ADMIN — CHLORHEXIDINE GLUCONATE 1 APPLICATION(S): 213 SOLUTION TOPICAL at 13:29

## 2022-08-11 RX ADMIN — Medication 975 MILLIGRAM(S): at 05:03

## 2022-08-11 RX ADMIN — HEPARIN SODIUM 5000 UNIT(S): 5000 INJECTION INTRAVENOUS; SUBCUTANEOUS at 05:03

## 2022-08-11 RX ADMIN — Medication 975 MILLIGRAM(S): at 17:54

## 2022-08-11 RX ADMIN — POTASSIUM PHOSPHATE, MONOBASIC POTASSIUM PHOSPHATE, DIBASIC 62.5 MILLIMOLE(S): 236; 224 INJECTION, SOLUTION INTRAVENOUS at 15:35

## 2022-08-11 RX ADMIN — Medication 100 GRAM(S): at 10:21

## 2022-08-11 RX ADMIN — Medication 975 MILLIGRAM(S): at 05:33

## 2022-08-11 RX ADMIN — FINASTERIDE 5 MILLIGRAM(S): 5 TABLET, FILM COATED ORAL at 13:26

## 2022-08-11 RX ADMIN — SODIUM CHLORIDE 100 MILLILITER(S): 9 INJECTION, SOLUTION INTRAVENOUS at 10:21

## 2022-08-11 RX ADMIN — Medication 975 MILLIGRAM(S): at 13:26

## 2022-08-11 RX ADMIN — HEPARIN SODIUM 5000 UNIT(S): 5000 INJECTION INTRAVENOUS; SUBCUTANEOUS at 15:38

## 2022-08-11 RX ADMIN — HEPARIN SODIUM 5000 UNIT(S): 5000 INJECTION INTRAVENOUS; SUBCUTANEOUS at 21:17

## 2022-08-11 NOTE — PROGRESS NOTE ADULT - SUBJECTIVE AND OBJECTIVE BOX
infectious diseases progress note:    Patient is a 72y old  Male who presents with a chief complaint of abdominal pain (11 Aug 2022 01:50)        Acute appendicitis with perforation and localized peritonitis, without abscess             Allergies    No Known Allergies    Intolerances        ANTIBIOTICS/RELEVANT:  antimicrobials  ertapenem  IVPB 1000 milliGRAM(s) IV Intermittent every 24 hours  ertapenem  IVPB        immunologic:    OTHER:  acetaminophen     Tablet .. 975 milliGRAM(s) Oral every 6 hours  chlorhexidine 2% Cloths 1 Application(s) Topical <User Schedule>  finasteride 5 milliGRAM(s) Oral daily  heparin   Injectable 5000 Unit(s) SubCutaneous every 8 hours  lactated ringers. 1000 milliLiter(s) IV Continuous <Continuous>  magnesium sulfate  IVPB 1 Gram(s) IV Intermittent once  oxyCODONE    IR 5 milliGRAM(s) Oral every 4 hours PRN  oxyCODONE    IR 2.5 milliGRAM(s) Oral every 4 hours PRN  potassium phosphate IVPB 15 milliMole(s) IV Intermittent once      Objective:  Vital Signs Last 24 Hrs  T(C): 36.9 (11 Aug 2022 09:06), Max: 37.4 (11 Aug 2022 00:30)  T(F): 98.4 (11 Aug 2022 09:06), Max: 99.3 (11 Aug 2022 00:30)  HR: 77 (11 Aug 2022 09:06) (65 - 81)  BP: 146/86 (11 Aug 2022 09:06) (110/64 - 152/83)  BP(mean): --  RR: 18 (11 Aug 2022 09:06) (18 - 18)  SpO2: 95% (11 Aug 2022 09:06) (94% - 97%)    Parameters below as of 11 Aug 2022 09:06  Patient On (Oxygen Delivery Method): room air           Eyes:GLORIA, EOMI  Ear/Nose/Throat: no oral lesion, no sinus tenderness on percussion	  Neck:no JVD, no lymphadenopathy, supple  Respiratory: CTA damion  Cardiovascular: S1S2 RRR, no murmurs  Gastrointestinal:soft, (+) BS, no HSM  Extremities:no e/e/c        LABS:                        10.2   11.70 )-----------( 173      ( 11 Aug 2022 06:29 )             31.3     08-11    134<L>  |  100  |  10  ----------------------------<  93  3.9   |  22  |  0.93    Ca    8.3<L>      11 Aug 2022 06:30  Phos  1.9     08-11  Mg     1.8     08-11              MICROBIOLOGY:    RECENT CULTURES:  08-05 @ 21:58 Clean Catch Clean Catch (Midstream)                No growth    08-05 @ 17:00 .Blood Blood-Peripheral                No Growth Final    08-05 @ 16:45 .Blood Blood-Peripheral                No Growth Final          RESPIRATORY CULTURES:              RADIOLOGY & ADDITIONAL STUDIES:        Pager [phone]  After 5 pm/weekends or if no response :[phone]

## 2022-08-11 NOTE — PROGRESS NOTE ADULT - SUBJECTIVE AND OBJECTIVE BOX
Red team Surgery Progress Note    INTERVAL EVENTS:   No acute events overnight.    SUBJECTIVE: Patient seen and examined at bedside with surgical team,     OBJECTIVE:    Vital Signs Last 24 Hrs  T(C): 37.4 (11 Aug 2022 00:30), Max: 37.4 (11 Aug 2022 00:30)  T(F): 99.3 (11 Aug 2022 00:30), Max: 99.3 (11 Aug 2022 00:30)  HR: 70 (11 Aug 2022 00:30) (65 - 71)  BP: 110/64 (11 Aug 2022 00:30) (110/64 - 137/76)  BP(mean): --  RR: 18 (11 Aug 2022 00:30) (18 - 18)  SpO2: 96% (11 Aug 2022 00:30) (94% - 97%)    Parameters below as of 11 Aug 2022 00:30  Patient On (Oxygen Delivery Method): room air    I&O's Detail    09 Aug 2022 07:01  -  10 Aug 2022 07:00  --------------------------------------------------------  IN:    IV PiggyBack: 50 mL    Lactated Ringers: 2400 mL    Oral Fluid: 820 mL  Total IN: 3270 mL    OUT:    Voided (mL): 725 mL  Total OUT: 725 mL    Total NET: 2545 mL      10 Aug 2022 07:01  -  11 Aug 2022 01:50  --------------------------------------------------------  IN:    IV PiggyBack: 50 mL    Lactated Ringers: 1200 mL    Oral Fluid: 480 mL  Total IN: 1730 mL    OUT:    Voided (mL): 300 mL  Total OUT: 300 mL    Total NET: 1430 mL      MEDICATIONS  (STANDING):  acetaminophen     Tablet .. 975 milliGRAM(s) Oral every 6 hours  ertapenem  IVPB      ertapenem  IVPB 1000 milliGRAM(s) IV Intermittent every 24 hours  finasteride 5 milliGRAM(s) Oral daily  heparin   Injectable 5000 Unit(s) SubCutaneous every 8 hours  lactated ringers. 1000 milliLiter(s) (100 mL/Hr) IV Continuous <Continuous>    MEDICATIONS  (PRN):  oxyCODONE    IR 5 milliGRAM(s) Oral every 4 hours PRN Severe Pain (7 - 10)  oxyCODONE    IR 2.5 milliGRAM(s) Oral every 4 hours PRN Moderate Pain (4 - 6)      PHYSICAL EXAM:  Constitutional: A&Ox3, NAD  Respiratory: Unlabored breathing  Abdomen:????  Extremities: WWP, CALERO spontaneously    LABS:                        10.1   10.19 )-----------( 135      ( 10 Aug 2022 07:24 )             30.8     08-10    135  |  102  |  11  ----------------------------<  94  4.0   |  24  |  0.91    Ca    8.1<L>      10 Aug 2022 07:24  Phos  2.5     08-10  Mg     1.8     08-10                IMAGING:     Red team Surgery Progress Note    INTERVAL EVENTS:   No acute events overnight.    SUBJECTIVE: Patient seen and examined at bedside with surgical team, no acute complaints. +/+ GI function    OBJECTIVE:    Vital Signs Last 24 Hrs  T(C): 37.4 (11 Aug 2022 00:30), Max: 37.4 (11 Aug 2022 00:30)  T(F): 99.3 (11 Aug 2022 00:30), Max: 99.3 (11 Aug 2022 00:30)  HR: 70 (11 Aug 2022 00:30) (65 - 71)  BP: 110/64 (11 Aug 2022 00:30) (110/64 - 137/76)  BP(mean): --  RR: 18 (11 Aug 2022 00:30) (18 - 18)  SpO2: 96% (11 Aug 2022 00:30) (94% - 97%)    Parameters below as of 11 Aug 2022 00:30  Patient On (Oxygen Delivery Method): room air    I&O's Detail    09 Aug 2022 07:01  -  10 Aug 2022 07:00  --------------------------------------------------------  IN:    IV PiggyBack: 50 mL    Lactated Ringers: 2400 mL    Oral Fluid: 820 mL  Total IN: 3270 mL    OUT:    Voided (mL): 725 mL  Total OUT: 725 mL    Total NET: 2545 mL      10 Aug 2022 07:01  -  11 Aug 2022 01:50  --------------------------------------------------------  IN:    IV PiggyBack: 50 mL    Lactated Ringers: 1200 mL    Oral Fluid: 480 mL  Total IN: 1730 mL    OUT:    Voided (mL): 300 mL  Total OUT: 300 mL    Total NET: 1430 mL      MEDICATIONS  (STANDING):  acetaminophen     Tablet .. 975 milliGRAM(s) Oral every 6 hours  ertapenem  IVPB      ertapenem  IVPB 1000 milliGRAM(s) IV Intermittent every 24 hours  finasteride 5 milliGRAM(s) Oral daily  heparin   Injectable 5000 Unit(s) SubCutaneous every 8 hours  lactated ringers. 1000 milliLiter(s) (100 mL/Hr) IV Continuous <Continuous>    MEDICATIONS  (PRN):  oxyCODONE    IR 5 milliGRAM(s) Oral every 4 hours PRN Severe Pain (7 - 10)  oxyCODONE    IR 2.5 milliGRAM(s) Oral every 4 hours PRN Moderate Pain (4 - 6)      PHYSICAL EXAM:  Constitutional: A&Ox3, NAD  Respiratory: Unlabored breathing  Abdomen: nondistended, nontender  Extremities: WWP, CALERO spontaneously    LABS:                        10.1   10.19 )-----------( 135      ( 10 Aug 2022 07:24 )             30.8     08-10    135  |  102  |  11  ----------------------------<  94  4.0   |  24  |  0.91    Ca    8.1<L>      10 Aug 2022 07:24  Phos  2.5     08-10  Mg     1.8     08-10                IMAGING:

## 2022-08-11 NOTE — CONSULT NOTE ADULT - ASSESSMENT
72 year old with symptoms for almost a month with pain, wt loss , anorexia admitted with missed AP.  Feels better since admission  but still bloated and not eating well  No localized abscess to drain.    non toxic.   discussed with pt and his wife all options  safest approach would be midline and 10- 14 days of Invanz with elective AP in the future 
 71yo M w/ hx of HTN, BPH, kidney stones that presents with  waxing and waning fatigue for approximately 3-4 weeks, loss of appetite and 10-15 unintentional weight loss. Over the past 24 hrs, he developed RLQ abdominal pain, fevers and chills. Labs remarkable for leukocytosis to 11, and CT scan consistent with perforated appendicitis.   
Patient is a 71 Y/O Male with Phx of HTN, BPH, kidney stones that presents with  waxing and waning fatigue for approximately 3-4 weeks, loss of appetite and 10-15 unintentional weight loss. Over the past 24 hrs, he developed RLQ abdominal pain, fevers and chills. Labs remarkable for leukocytosis to 11, and CT scan consistent with perforated appendicitis.  No chest pain, shortness of breath or palpitations.     # Perforated appendicitis   surg care appreciated  on long term antibiotics  ID evla appreciated  monitor electrolytes, pain control   diet as tolerated       # HTN   monitor vitals   home meds if needed ( losartan and HTTZ)     # BPH finasteride  monitor output       Discussed in detail with patient and his daughter over the phone

## 2022-08-11 NOTE — PROGRESS NOTE ADULT - ASSESSMENT
72 year old with symptoms for almost a month with pain, wt loss , anorexia admitted with missed AP.  Feels better since admission  but still bloated and not eating well  No localized abscess to drain.    non toxic.   discussed with pt and his wife all options  safest approach would be midline and 10- 14 days of Invanz with elective AP in the future  His exam is benign and he is eating well and feels well       Ct reviewed and discussed with Dr. maurice espinosa pt in detail yesterday afternoon and this am  All options discussed and questions answered     I favor conservative approach at this point and repeating the CT in 10 days if needed.

## 2022-08-11 NOTE — CONSULT NOTE ADULT - SUBJECTIVE AND OBJECTIVE BOX
Patient is a 71 Y/O Male with Phx of HTN, BPH, kidney stones that presents with  waxing and waning fatigue for approximately 3-4 weeks, loss of appetite and 10-15 unintentional weight loss. Over the past 24 hrs, he developed RLQ abdominal pain, fevers and chills. Labs remarkable for leukocytosis to 11, and CT scan consistent with perforated appendicitis.  No chest pain, shortness of breath or palpitations.       REVIEW OF SYSTEMS:    CONSTITUTIONAL: No weakness, fevers or chills  EYES/ENT: No visual changes;  No vertigo or throat pain   NECK: No pain or stiffness  RESPIRATORY: No cough, wheezing, hemoptysis; No shortness of breath  CARDIOVASCULAR: No chest pain or palpitations  GASTROINTESTINAL: No abdominal or epigastric pain. No nausea, vomiting, or hematemesis; No diarrhea or constipation. No melena or hematochezia.  GENITOURINARY: No dysuria, frequency or hematuria  NEUROLOGICAL: No numbness or weakness  SKIN: No itching, burning, rashes, or lesions   All other review of systems is negative unless indicated above.      PAST MEDICAL & SURGICAL HISTORY:  BPH (benign prostatic hyperplasia)  HTN (hypertension)  Kidney stones    MEDICATIONS  (STANDING):  acetaminophen     Tablet .. 975 milliGRAM(s) Oral every 6 hours  chlorhexidine 2% Cloths 1 Application(s) Topical <User Schedule>  ertapenem  IVPB      ertapenem  IVPB 1000 milliGRAM(s) IV Intermittent every 24 hours  finasteride 5 milliGRAM(s) Oral daily  heparin   Injectable 5000 Unit(s) SubCutaneous every 8 hours  potassium phosphate IVPB 15 milliMole(s) IV Intermittent once    MEDICATIONS  (PRN):  oxyCODONE    IR 5 milliGRAM(s) Oral every 4 hours PRN Severe Pain (7 - 10)  oxyCODONE    IR 2.5 milliGRAM(s) Oral every 4 hours PRN Moderate Pain (4 - 6)    Home Medications:  finasteride 5 mg oral tablet: 1 tab(s) orally once a day (05 Aug 2022 20:21)  hydroCHLOROthiazide 12.5 mg oral tablet: 1 tab(s) orally once a day (05 Aug 2022 20:21)  losartan 100 mg oral tablet: 1 tab(s) orally once a day (05 Aug 2022 20:21)    Allergies    No Known Allergies    Intolerances    Vital Signs Last 24 Hrs  T(C): 37.4 (11 Aug 2022 12:27), Max: 37.4 (11 Aug 2022 00:30)  T(F): 99.3 (11 Aug 2022 12:27), Max: 99.3 (11 Aug 2022 00:30)  HR: 76 (11 Aug 2022 12:27) (65 - 81)  BP: 139/82 (11 Aug 2022 12:27) (110/64 - 152/83)  BP(mean): --  RR: 18 (11 Aug 2022 12:27) (18 - 18)  SpO2: 93% (11 Aug 2022 12:27) (93% - 96%)    Appearance: Normal	  HEENT:   Normal oral mucosa, PERRL, EOMI	  Lymphatic: No lymphadenopathy , no edema  Cardiovascular: Normal S1 S2, No JVD, No murmurs , Peripheral pulses palpable 2+ bilaterally  Respiratory: Lungs clear to auscultation, normal effort 	  Gastrointestinal:  Soft, Non-tender, + BS	  Skin: No rashes, No ecchymoses, No cyanosis, warm to touch  Musculoskeletal: Normal range of motion, normal strength  Psychiatry:  Mood & affect appropriate  Ext: No edema                          10.2   11.70 )-----------( 173      ( 11 Aug 2022 06:29 )             31.3               08-11    134<L>  |  100  |  10  ----------------------------<  93  3.9   |  22  |  0.93    Ca    8.3<L>      11 Aug 2022 06:30  Phos  1.9     08-11  Mg     1.8     08-11                             
CHIEF COMPLAINT:    HISTORY OF PRESENT ILLNESS:   71yo M w/ hx of HTN, BPH, kidney stones that presents with  waxing and waning fatigue for approximately 3-4 weeks, loss of appetite and 10-15 unintentional weight loss. Over the past 24 hrs, he developed RLQ abdominal pain, fevers and chills. Labs remarkable for leukocytosis to 11, and CT scan consistent with perforated appendicitis.   No chest pain, shortness of breath or palpitations.         PAST MEDICAL & SURGICAL HISTORY:  BPH (benign prostatic hyperplasia)      HTN (hypertension)      Kidney stones              MEDICATIONS:  heparin   Injectable 5000 Unit(s) SubCutaneous every 8 hours    ertapenem  IVPB 1000 milliGRAM(s) IV Intermittent every 24 hours  ertapenem  IVPB          acetaminophen     Tablet .. 975 milliGRAM(s) Oral every 6 hours  oxyCODONE    IR 5 milliGRAM(s) Oral every 4 hours PRN  oxyCODONE    IR 2.5 milliGRAM(s) Oral every 4 hours PRN      finasteride 5 milliGRAM(s) Oral daily    chlorhexidine 2% Cloths 1 Application(s) Topical <User Schedule>  lactated ringers. 1000 milliLiter(s) IV Continuous <Continuous>  magnesium sulfate  IVPB 1 Gram(s) IV Intermittent once  potassium phosphate IVPB 15 milliMole(s) IV Intermittent once      FAMILY HISTORY:      SOCIAL HISTORY:    [ ] Non-smoker  [ ] Smoker  [ ] Alcohol    Allergies    No Known Allergies    Intolerances    	    REVIEW OF SYSTEMS:  CONSTITUTIONAL: No fever, weight loss,+ fatigue  EYES: No eye pain, visual disturbances, or discharge  ENMT:  No difficulty hearing, tinnitus, vertigo; No sinus or throat pain  NECK: No pain or stiffness  RESPIRATORY: No cough, wheezing, chills or hemoptysis; No Shortness of Breath  CARDIOVASCULAR: No chest pain, palpitations, passing out, dizziness, or leg swelling  GASTROINTESTINAL: + abdominal or epigastric pain. No nausea, vomiting, or hematemesis; No diarrhea or constipation. No melena or hematochezia.  GENITOURINARY: No dysuria, frequency, hematuria, or incontinence  NEUROLOGICAL: No headaches, memory loss, loss of strength, numbness, or tremors  SKIN: No itching, burning, rashes, or lesions   LYMPH Nodes: No enlarged glands  ENDOCRINE: No heat or cold intolerance; No hair loss  MUSCULOSKELETAL: No joint pain or swelling; No muscle, back, or extremity pain  PSYCHIATRIC: No depression, anxiety, mood swings, or difficulty sleeping  HEME/LYMPH: No easy bruising, or bleeding gums  ALLERY AND IMMUNOLOGIC: No hives or eczema	    [ ] All others negative	  [ ] Unable to obtain    PHYSICAL EXAM:  T(C): 36.9 (08-11-22 @ 09:06), Max: 37.4 (08-11-22 @ 00:30)  HR: 70 (08-11-22 @ 09:06) (65 - 81)  BP: 142/72 (08-11-22 @ 09:06) (110/64 - 152/83)  RR: 18 (08-11-22 @ 09:06) (18 - 18)  SpO2: 95% (08-11-22 @ 09:06) (94% - 97%)  Wt(kg): --  I&O's Summary    10 Aug 2022 07:01  -  11 Aug 2022 07:00  --------------------------------------------------------  IN: 3050 mL / OUT: 700 mL / NET: 2350 mL        Appearance: Normal	  HEENT:   Normal oral mucosa, PERRL, EOMI	  Lymphatic: No lymphadenopathy  Cardiovascular: Normal S1 S2, No JVD, No murmurs, No edema  Respiratory: Lungs clear to auscultation	  Psychiatry: A & O x 3, Mood & affect appropriate  Gastrointestinal:  Soft, +distended Non-tender, + BS	  Skin: No rashes, No ecchymoses, No cyanosis	  Neurologic: Non-focal  Extremities: Normal range of motion, No clubbing, cyanosis or edema  Vascular: Peripheral pulses palpable 2+ bilaterally    TELEMETRY: 	    ECG:  	Sinus tach non specific stt changes   RADIOLOGY:  < from: CT Abdomen and Pelvis w/ Oral Cont and w/ IV Cont (08.10.22 @ 09:43) >    ACC: [id number] EXAM:  CT ABDOMEN AND PELVIS OC IC                          PROCEDURE DATE:  08/10/2022          INTERPRETATION:  CLINICAL INFORMATION: Reevaluate perforated   appendicitis; abdominal pain.    COMPARISON: CT abdomen pelvis 8/5/2022.    CONTRAST/COMPLICATIONS:  IV Contrast: Omnipaque 350  90 cc administered   10 cc discarded  Oral Contrast: Omnipaque 350  Complications: None reported at time of study completion    PROCEDURE:  CT of the Abdomen and Pelvis was performed.  Sagittal and coronal reformats were performed.    FINDINGS:  LOWER CHEST: Small right greater than left pleural effusions, increased   since 8/5/2022. Bilateral lower lung subsegmental atelectasis.    LIVER: Within normal limits.  BILE DUCTS: Normal caliber.  GALLBLADDER: Cholelithiasis.  SPLEEN: Within normal limits.  PANCREAS: Within normal limits.  ADRENALS: Within normal limits.  KIDNEYS/URETERS: No hydronephrosis. Redemonstrated bilateral cysts, the   largest measures approximately 9 cm in the right interpolar region, and 6   cm in the left inferior pole. Again seen are bilateral nonobstructing   renal calculi measuring up to 0.5 cm.    BLADDER: Underdistended.  REPRODUCTIVE ORGANS: Prostate is enlarged and heterogeneous with   partially calcified lesion is described on prior CT.    BOWEL: No bowel obstruction. Appendix is increased in size since   8/5/2022, now measuring up to 2.5 cm. Again seen is significant mural   thickening and wall irregularities with surrounding fluid and fat   stranding. Oral contrast does not enter the appendix or adjacent fluid.   Diverticulosis.  PERITONEUM: Right lower quadrant fluid as described.  VESSELS: Atherosclerotic changes.  RETROPERITONEUM/LYMPH NODES: No lymphadenopathy.  ABDOMINAL WALL: Within normal limits.  BONES: Degenerative changes.    IMPRESSION:  Redemonstrated appendicitis with signs of local perforation. The appendix   and adjacent fluid are slightly increased in size since 8/5/2022.    Redemonstrated enlarged heterogeneous prostate gland.        --- End of Report ---           KATELIN GILMAN MD; Resident Radiologist  This document has been electronically signed.  KATRINA YING MD; Attending Radiologist  This document has been electronically signed. Aug 10 2022 10:42AM    < end of copied text >    OTHER: 	  	  LABS:	 	    CARDIAC MARKERS:                                  10.2   11.70 )-----------( 173      ( 11 Aug 2022 06:29 )             31.3     08-11    134<L>  |  100  |  10  ----------------------------<  93  3.9   |  22  |  0.93    Ca    8.3<L>      11 Aug 2022 06:30  Phos  1.9     08-11  Mg     1.8     08-11      proBNP:   Lipid Profile:   HgA1c:   TSH:           
Patient is a 72y old  Male who presents with a chief complaint of abdominal pain (08 Aug 2022 02:56)    HPI:  Patient is a 73yo M w/ hx of HTN, BPH, kidney stones that presents after a CT noncontrast obtained at an outside office concerning for acute appendicitis. Patient reports waxing and waning fatigue for approximately 3-4 weeks, loss of appetite and 10-15 unintentional weight loss. Over the past 24 hrs, he developed RLQ abdominal pain, fevers and chills. No nausea or vomiting. Passing flatus and having bowel movements that sometimes are darker. Last colonoscopy was 2-3 yrs ago that per patient showed polyps that were removed.     In the ED, patient was febrile to 102.2 and tachycardic to 115. He received a dose of zosyn and one 1L bolus of LR.   (05 Aug 2022 20:06)      PAST MEDICAL & SURGICAL HISTORY:  BPH (benign prostatic hyperplasia)      HTN (hypertension)      Kidney stones          Social history:    FAMILY HISTORY:    REVIEW OF SYSTEMS  General:	Denies any malaise fatigue or chills. Fevers absent    Skin:No rash  	  Ophthalmologic:Denies any visual complaints,discharge redness or photophobia  	  ENMT:No nasal discharge,headache,sinus congestion or throat pain.No dental complaints    Respiratory and Thorax:No cough,sputum or chest pain.Denies shortness of breath  	  Cardiovascular:	No chest pain,palpitaions or dizziness    Gastrointestinal:	NO nausea,abdominal pain or diarrhea.    Genitourinary:	No dysuria,frequency. No flank pain    Musculoskeletal:	No joint swelling or pain.No weakness    Neurological:No confusion,diziness.No extremity weakness.No bladder or bowel incontinence	    Psychiatric:No delusions or hallucinations	    Hematology/Lymphatics:	No LN swelling.No gum bleeding     Endocrine:	No recent weight gain or loss.No abnormal heat/cold intolerance    Allergic/Immunologic:	No hives or rash   Allergies    No Known Allergies    Intolerances        Antimicrobials:    piperacillin/tazobactam IVPB.. 3.375 Gram(s) IV Intermittent every 8 hours        Vital Signs Last 24 Hrs  T(C): 36.7 (08 Aug 2022 09:52), Max: 38.1 (08 Aug 2022 00:01)  T(F): 98 (08 Aug 2022 09:52), Max: 100.5 (08 Aug 2022 00:01)  HR: 72 (08 Aug 2022 09:52) (63 - 77)  BP: 110/59 (08 Aug 2022 09:52) (102/60 - 131/79)  BP(mean): --  RR: 18 (08 Aug 2022 09:52) (18 - 18)  SpO2: 96% (08 Aug 2022 09:52) (95% - 98%)    Parameters below as of 08 Aug 2022 09:52  Patient On (Oxygen Delivery Method): room air        PHYSICAL EXAM:Pleasant patient in no acute distress.      Constitutional:Comfortable.Awake and alert  No cachexia     Eyes:PERRL EOMI.NO discharge or conjunctival injection    ENMT:No sinus tenderness.No thrush.No pharyngeal exudate or erythema.Fair dental hygiene    Neck:Supple,No LN,no JVD      Respiratory:Good air entry bilaterally,CTA    Cardiovascular:S1 S2 wnl, No murmurs,rub or gallops    Gastrointestinal:Soft BS(+)  tenderness no masses ,No rebound or guarding    Genitourinary:No CVA tendereness          Psychiatric:Affect normal.                                10.3   8.59  )-----------( 121      ( 08 Aug 2022 07:04 )             31.5         08-08    135  |  102  |  12  ----------------------------<  97  3.8   |  26  |  1.14    Ca    8.1<L>      08 Aug 2022 07:02  Phos  2.8     08-07  Mg     2.0     08-07        RECENT CULTURES:  08-05 @ 21:58  Clean Catch Clean Catch (Midstream)  --  --  --    No growth  --  08-05 @ 17:00  .Blood Blood-Peripheral  --  --  --    No growth to date.  --  08-05 @ 16:45  .Blood Blood-Peripheral  --  --  --    No growth to date.  --      MICROBIOLOGY:  Culture Results:   No growth (08-05 @ 21:58)  Culture Results:   No growth to date. (08-05 @ 17:00)  Culture Results:   No growth to date. (08-05 @ 16:45)          Radiology:      Assessment:        Recommendations and Plan:    Pager [phone]  After 5 pm/weekends or if no response :[phone]

## 2022-08-11 NOTE — PROGRESS NOTE ADULT - ASSESSMENT
Patient is a 71yo M w/ hx of HTN, BPH, kidney stones that presents with  waxing and waning fatigue for approximately 3-4 weeks, loss of appetite and 10-15 unintentional weight loss. Over the past 24 hrs, he developed RLQ abdominal pain, fevers and chills. Labs remarkable for leukocytosis to 11, and CT scan consistent with perforated appendicitis.       Plan:  - Regular diet   - ID consulted:  Invanz  - midline in place  - monitor for fevers  - Pain control prn  - on home meds   - DVT ppx   - AM labs  - Dispo: dc home with [facility] for iv invanz sukumar        [facility] Surgery  [id number] Patient is a 71yo M w/ hx of HTN, BPH, kidney stones that presents with  waxing and waning fatigue for approximately 3-4 weeks, loss of appetite and 10-15 unintentional weight loss. Over the past 24 hrs, he developed RLQ abdominal pain, fevers and chills. Labs remarkable for leukocytosis to 11, and CT scan consistent with perforated appendicitis.       Plan:  - Regular diet   - ID consulted: Invanz  - midline in place  - monitor for fevers  - Pain control prn  - on home meds   - DVT ppx   - AM labs  - Dispo: dc home with [facility]NS for iv invanz later today        Red Surgery  [id number]

## 2022-08-12 ENCOUNTER — TRANSCRIPTION ENCOUNTER (OUTPATIENT)
Age: 72
End: 2022-08-12

## 2022-08-12 LAB
ANION GAP SERPL CALC-SCNC: 8 MMOL/L — SIGNIFICANT CHANGE UP (ref 5–17)
APTT BLD: 31.3 SEC — SIGNIFICANT CHANGE UP (ref 27.5–35.5)
BLD GP AB SCN SERPL QL: NEGATIVE — SIGNIFICANT CHANGE UP
BUN SERPL-MCNC: 10 MG/DL — SIGNIFICANT CHANGE UP (ref 7–23)
CALCIUM SERPL-MCNC: 7.8 MG/DL — LOW (ref 8.4–10.5)
CHLORIDE SERPL-SCNC: 102 MMOL/L — SIGNIFICANT CHANGE UP (ref 96–108)
CO2 SERPL-SCNC: 24 MMOL/L — SIGNIFICANT CHANGE UP (ref 22–31)
CREAT SERPL-MCNC: 0.92 MG/DL — SIGNIFICANT CHANGE UP (ref 0.5–1.3)
EGFR: 89 ML/MIN/1.73M2 — SIGNIFICANT CHANGE UP
GLUCOSE SERPL-MCNC: 101 MG/DL — HIGH (ref 70–99)
HCT VFR BLD CALC: 28.9 % — LOW (ref 39–50)
HGB BLD-MCNC: 9.5 G/DL — LOW (ref 13–17)
INR BLD: 1.24 RATIO — HIGH (ref 0.88–1.16)
MAGNESIUM SERPL-MCNC: 1.9 MG/DL — SIGNIFICANT CHANGE UP (ref 1.6–2.6)
MCHC RBC-ENTMCNC: 30.7 PG — SIGNIFICANT CHANGE UP (ref 27–34)
MCHC RBC-ENTMCNC: 32.9 GM/DL — SIGNIFICANT CHANGE UP (ref 32–36)
MCV RBC AUTO: 93.5 FL — SIGNIFICANT CHANGE UP (ref 80–100)
NRBC # BLD: 0 /100 WBCS — SIGNIFICANT CHANGE UP (ref 0–0)
PHOSPHATE SERPL-MCNC: 2.5 MG/DL — SIGNIFICANT CHANGE UP (ref 2.5–4.5)
PLATELET # BLD AUTO: 196 K/UL — SIGNIFICANT CHANGE UP (ref 150–400)
POTASSIUM SERPL-MCNC: 3.9 MMOL/L — SIGNIFICANT CHANGE UP (ref 3.5–5.3)
POTASSIUM SERPL-SCNC: 3.9 MMOL/L — SIGNIFICANT CHANGE UP (ref 3.5–5.3)
PROTHROM AB SERPL-ACNC: 14.3 SEC — HIGH (ref 10.5–13.4)
RBC # BLD: 3.09 M/UL — LOW (ref 4.2–5.8)
RBC # FLD: 13.6 % — SIGNIFICANT CHANGE UP (ref 10.3–14.5)
RH IG SCN BLD-IMP: NEGATIVE — SIGNIFICANT CHANGE UP
SARS-COV-2 RNA SPEC QL NAA+PROBE: SIGNIFICANT CHANGE UP
SODIUM SERPL-SCNC: 134 MMOL/L — LOW (ref 135–145)
WBC # BLD: 11.01 K/UL — HIGH (ref 3.8–10.5)
WBC # FLD AUTO: 11.01 K/UL — HIGH (ref 3.8–10.5)

## 2022-08-12 RX ORDER — HEPARIN SODIUM 5000 [USP'U]/ML
5000 INJECTION INTRAVENOUS; SUBCUTANEOUS EVERY 8 HOURS
Refills: 0 | Status: DISCONTINUED | OUTPATIENT
Start: 2022-08-12 | End: 2022-08-15

## 2022-08-12 RX ORDER — ACETAMINOPHEN 500 MG
3 TABLET ORAL
Qty: 0 | Refills: 0 | DISCHARGE
Start: 2022-08-12

## 2022-08-12 RX ORDER — LOSARTAN POTASSIUM 100 MG/1
100 TABLET, FILM COATED ORAL DAILY
Refills: 0 | Status: DISCONTINUED | OUTPATIENT
Start: 2022-08-12 | End: 2022-08-15

## 2022-08-12 RX ADMIN — HEPARIN SODIUM 5000 UNIT(S): 5000 INJECTION INTRAVENOUS; SUBCUTANEOUS at 05:01

## 2022-08-12 RX ADMIN — Medication 975 MILLIGRAM(S): at 00:14

## 2022-08-12 RX ADMIN — Medication 975 MILLIGRAM(S): at 05:32

## 2022-08-12 RX ADMIN — Medication 975 MILLIGRAM(S): at 13:05

## 2022-08-12 RX ADMIN — FINASTERIDE 5 MILLIGRAM(S): 5 TABLET, FILM COATED ORAL at 12:34

## 2022-08-12 RX ADMIN — Medication 975 MILLIGRAM(S): at 12:34

## 2022-08-12 RX ADMIN — ERTAPENEM SODIUM 120 MILLIGRAM(S): 1 INJECTION, POWDER, LYOPHILIZED, FOR SOLUTION INTRAMUSCULAR; INTRAVENOUS at 12:33

## 2022-08-12 RX ADMIN — CHLORHEXIDINE GLUCONATE 1 APPLICATION(S): 213 SOLUTION TOPICAL at 13:44

## 2022-08-12 RX ADMIN — Medication 975 MILLIGRAM(S): at 05:02

## 2022-08-12 RX ADMIN — Medication 975 MILLIGRAM(S): at 00:44

## 2022-08-12 RX ADMIN — HEPARIN SODIUM 5000 UNIT(S): 5000 INJECTION INTRAVENOUS; SUBCUTANEOUS at 12:34

## 2022-08-12 RX ADMIN — HEPARIN SODIUM 5000 UNIT(S): 5000 INJECTION INTRAVENOUS; SUBCUTANEOUS at 21:28

## 2022-08-12 NOTE — DISCHARGE NOTE NURSING/CASE MANAGEMENT/SOCIAL WORK - PATIENT PORTAL LINK FT
You can access the FollowMyHealth Patient Portal offered by [facility] by registering at the following website: [email or web address]. By joining [facility]’s FollowMyHealth portal, you will also be able to view your health information using other applications (apps) compatible with our system.

## 2022-08-12 NOTE — PROGRESS NOTE ADULT - ASSESSMENT
Patient is a 71yo M w/ hx of HTN, BPH, kidney stones that presents with  waxing and waning fatigue for approximately 3-4 weeks, loss of appetite and 10-15 unintentional weight loss. Over the past 24 hrs, he developed RLQ abdominal pain, fevers and chills. Labs remarkable for leukocytosis to 11, and CT scan consistent with perforated appendicitis. CT on08/10 Redemonstrated appendicitis with signs of local perforation. The appendix and adjacent fluid are slightly increased in size since 8/5/2022.    Plan:  - NPO since midnight   - ID recs: Invanz  - monitor for fevers  - Pain control prn  - on home meds   - AM labs          Red Surgery  [id number] Patient is a 71yo M w/ hx of HTN, BPH, kidney stones that presents with  waxing and waning fatigue for approximately 3-4 weeks, loss of appetite and 10-15 unintentional weight loss. Over the past 24 hrs, he developed RLQ abdominal pain, fevers and chills. Labs remarkable for leukocytosis to 11, and CT scan consistent with perforated appendicitis. CT on08/10 Redemonstrated appendicitis with signs of local perforation. The appendix and adjacent fluid are slightly increased in size since 8/5/2022.    Plan:  - Dispo home today   - LRD diet   - ID recs: d/c on Invanz, got midline  - monitor for fevers  - Pain control prn  - on home meds   - AM labs          Red Surgery  [id number]

## 2022-08-12 NOTE — PROGRESS NOTE ADULT - SUBJECTIVE AND OBJECTIVE BOX
Red team Surgery Progress Note    INTERVAL EVENTS:   No acute events overnight.    SUBJECTIVE: Patient seen and examined at bedside with surgical team,     OBJECTIVE:    Vital Signs Last 24 Hrs  T(C): 37 (11 Aug 2022 21:42), Max: 37.4 (11 Aug 2022 00:30)  T(F): 98.6 (11 Aug 2022 21:42), Max: 99.3 (11 Aug 2022 00:30)  HR: 69 (11 Aug 2022 21:42) (69 - 81)  BP: 120/78 (11 Aug 2022 21:42) (110/64 - 152/83)  BP(mean): --  RR: 18 (11 Aug 2022 21:42) (18 - 18)  SpO2: 95% (11 Aug 2022 21:42) (93% - 96%)    Parameters below as of 11 Aug 2022 21:42  Patient On (Oxygen Delivery Method): room air    I&O's Detail    10 Aug 2022 07:01  -  11 Aug 2022 07:00  --------------------------------------------------------  IN:    IV PiggyBack: 50 mL    Lactated Ringers: 2400 mL    Oral Fluid: 600 mL  Total IN: 3050 mL    OUT:    Voided (mL): 700 mL  Total OUT: 700 mL    Total NET: 2350 mL      11 Aug 2022 07:01  -  12 Aug 2022 00:19  --------------------------------------------------------  IN:    IV PiggyBack: 50 mL    Oral Fluid: 585 mL  Total IN: 635 mL    OUT:    Voided (mL): 875 mL  Total OUT: 875 mL    Total NET: -240 mL      MEDICATIONS  (STANDING):  acetaminophen     Tablet .. 975 milliGRAM(s) Oral every 6 hours  chlorhexidine 2% Cloths 1 Application(s) Topical <User Schedule>  ertapenem  IVPB 1000 milliGRAM(s) IV Intermittent every 24 hours  ertapenem  IVPB      finasteride 5 milliGRAM(s) Oral daily  heparin   Injectable 5000 Unit(s) SubCutaneous every 8 hours  lactated ringers. 1000 milliLiter(s) (100 mL/Hr) IV Continuous <Continuous>    MEDICATIONS  (PRN):  oxyCODONE    IR 5 milliGRAM(s) Oral every 4 hours PRN Severe Pain (7 - 10)  oxyCODONE    IR 2.5 milliGRAM(s) Oral every 4 hours PRN Moderate Pain (4 - 6)      PHYSICAL EXAM:  Constitutional: A&Ox3, NAD  Respiratory: Unlabored breathing  Abdomen: ??????????  Extremities: WWP, CALERO spontaneously    LABS:                        10.2   11.70 )-----------( 173      ( 11 Aug 2022 06:29 )             31.3     08-11    134<L>  |  100  |  10  ----------------------------<  93  3.9   |  22  |  0.93    Ca    8.3<L>      11 Aug 2022 06:30  Phos  1.9     08-11  Mg     1.8     08-11                IMAGING:     Red team Surgery Progress Note    INTERVAL EVENTS:   No acute events overnight.    SUBJECTIVE: Patient seen and examined at bedside with surgical team, no pain, +/+ GI function, wants to go home.    OBJECTIVE:    Vital Signs Last 24 Hrs  T(C): 37 (11 Aug 2022 21:42), Max: 37.4 (11 Aug 2022 00:30)  T(F): 98.6 (11 Aug 2022 21:42), Max: 99.3 (11 Aug 2022 00:30)  HR: 69 (11 Aug 2022 21:42) (69 - 81)  BP: 120/78 (11 Aug 2022 21:42) (110/64 - 152/83)  BP(mean): --  RR: 18 (11 Aug 2022 21:42) (18 - 18)  SpO2: 95% (11 Aug 2022 21:42) (93% - 96%)    Parameters below as of 11 Aug 2022 21:42  Patient On (Oxygen Delivery Method): room air    I&O's Detail    10 Aug 2022 07:01  -  11 Aug 2022 07:00  --------------------------------------------------------  IN:    IV PiggyBack: 50 mL    Lactated Ringers: 2400 mL    Oral Fluid: 600 mL  Total IN: 3050 mL    OUT:    Voided (mL): 700 mL  Total OUT: 700 mL    Total NET: 2350 mL      11 Aug 2022 07:01  -  12 Aug 2022 00:19  --------------------------------------------------------  IN:    IV PiggyBack: 50 mL    Oral Fluid: 585 mL  Total IN: 635 mL    OUT:    Voided (mL): 875 mL  Total OUT: 875 mL    Total NET: -240 mL      MEDICATIONS  (STANDING):  acetaminophen     Tablet .. 975 milliGRAM(s) Oral every 6 hours  chlorhexidine 2% Cloths 1 Application(s) Topical <User Schedule>  ertapenem  IVPB 1000 milliGRAM(s) IV Intermittent every 24 hours  ertapenem  IVPB      finasteride 5 milliGRAM(s) Oral daily  heparin   Injectable 5000 Unit(s) SubCutaneous every 8 hours  lactated ringers. 1000 milliLiter(s) (100 mL/Hr) IV Continuous <Continuous>    MEDICATIONS  (PRN):  oxyCODONE    IR 5 milliGRAM(s) Oral every 4 hours PRN Severe Pain (7 - 10)  oxyCODONE    IR 2.5 milliGRAM(s) Oral every 4 hours PRN Moderate Pain (4 - 6)      PHYSICAL EXAM:  Constitutional: A&Ox3, NAD  Respiratory: Unlabored breathing  Abdomen: nontender, nondistended  Extremities: WWP, CALERO spontaneously    LABS:                        10.2   11.70 )-----------( 173      ( 11 Aug 2022 06:29 )             31.3     08-11    134<L>  |  100  |  10  ----------------------------<  93  3.9   |  22  |  0.93    Ca    8.3<L>      11 Aug 2022 06:30  Phos  1.9     08-11  Mg     1.8     08-11                IMAGING:

## 2022-08-12 NOTE — PROGRESS NOTE ADULT - ASSESSMENT
Patient is a 73 Y/O Male with Phx of HTN, BPH, kidney stones that presents with  waxing and waning fatigue for approximately 3-4 weeks, loss of appetite and 10-15 unintentional weight loss. Over the past 24 hrs, he developed RLQ abdominal pain, fevers and chills. Labs remarkable for leukocytosis to 11, and CT scan consistent with perforated appendicitis.  No chest pain, shortness of breath or palpitations.     # Perforated appendicitis   surg care appreciated  on long term antibiotics, Picc placed, pt education   ID evla appreciated  monitor electrolytes, pain control   diet as tolerated       # HTN   monitor vitals   home meds if needed ( losartan and HTTZ)     # BPH finasteride  monitor output       Discussed in detail with patient

## 2022-08-12 NOTE — PROGRESS NOTE ADULT - SUBJECTIVE AND OBJECTIVE BOX
Name of Patient : BREN HARVEY  MRN: [id number]  Date of visit: 08-12-22 @ 14:57      Subjective: Patient seen and examined. No new events except as noted.   doing okay  on antibiotics, picc in          REVIEW OF SYSTEMS:    CONSTITUTIONAL: No weakness, fevers or chills  EYES/ENT: No visual changes;  No vertigo or throat pain   NECK: No pain or stiffness  RESPIRATORY: No cough, wheezing, hemoptysis; No shortness of breath  CARDIOVASCULAR: No chest pain or palpitations  GASTROINTESTINAL: mild abdominal discomfort   GENITOURINARY: No dysuria, frequency or hematuria  NEUROLOGICAL: No numbness or weakness  SKIN: No itching, burning, rashes, or lesions   All other review of systems is negative unless indicated above.    MEDICATIONS:  MEDICATIONS  (STANDING):  acetaminophen     Tablet .. 975 milliGRAM(s) Oral every 6 hours  chlorhexidine 2% Cloths 1 Application(s) Topical <User Schedule>  ertapenem  IVPB 1000 milliGRAM(s) IV Intermittent every 24 hours  ertapenem  IVPB      finasteride 5 milliGRAM(s) Oral daily  heparin   Injectable 5000 Unit(s) SubCutaneous every 8 hours  losartan 100 milliGRAM(s) Oral daily      PHYSICAL EXAM:  T(C): 37.2 (08-12-22 @ 12:48), Max: 37.2 (08-12-22 @ 12:48)  HR: 74 (08-12-22 @ 12:48) (69 - 81)  BP: 144/82 (08-12-22 @ 12:48) (119/65 - 144/96)  RR: 18 (08-12-22 @ 12:48) (18 - 18)  SpO2: 94% (08-12-22 @ 12:48) (94% - 96%)  Wt(kg): --  I&O's Summary    11 Aug 2022 07:01  -  12 Aug 2022 07:00  --------------------------------------------------------  IN: 1335 mL / OUT: 1275 mL / NET: 60 mL    12 Aug 2022 07:01  -  12 Aug 2022 14:57  --------------------------------------------------------  IN: 325 mL / OUT: 450 mL / NET: -125 mL          Appearance: Normal	  HEENT:  PERRLA   Lymphatic: No lymphadenopathy   Cardiovascular: Normal S1 S2, no JVD  Respiratory: normal effort , clear  Gastrointestinal:  + abdominal pain on palpation   Skin: No rashes,  warm to touch  Psychiatry:  Mood & affect appropriate  Musculuskeletal: No edema      All labs, Imaging and EKGs personally reviewed       08-11-22 @ 07:01  -  08-12-22 @ 07:00  --------------------------------------------------------  IN: 1335 mL / OUT: 1275 mL / NET: 60 mL    08-12-22 @ 07:01  -  08-12-22 @ 14:57  --------------------------------------------------------  IN: 325 mL / OUT: 450 mL / NET: -125 mL                          9.5    11.01 )-----------( 196      ( 12 Aug 2022 07:19 )             28.9               08-12    134<L>  |  102  |  10  ----------------------------<  101<H>  3.9   |  24  |  0.92    Ca    7.8<L>      12 Aug 2022 07:19  Phos  2.5     08-12  Mg     1.9     08-12      PT/INR - ( 12 Aug 2022 07:20 )   PT: 14.3 sec;   INR: 1.24 ratio         PTT - ( 12 Aug 2022 07:20 )  PTT:31.3 sec

## 2022-08-12 NOTE — PROGRESS NOTE ADULT - SUBJECTIVE AND OBJECTIVE BOX
Subjective: Patient seen and examined. No new events except as noted.   resting comfortably       REVIEW OF SYSTEMS:    CONSTITUTIONAL: No weakness, fevers or chills  EYES/ENT: No visual changes;  No vertigo or throat pain   NECK: No pain or stiffness  RESPIRATORY: No cough, wheezing, hemoptysis; No shortness of breath  CARDIOVASCULAR: No chest pain or palpitations  GASTROINTESTINAL: No abdominal or epigastric pain. No nausea, vomiting, or hematemesis; No diarrhea or constipation. No melena or hematochezia.  GENITOURINARY: No dysuria, frequency or hematuria  NEUROLOGICAL: No numbness or weakness  SKIN: No itching, burning, rashes, or lesions   All other review of systems is negative unless indicated above.    MEDICATIONS:  MEDICATIONS  (STANDING):  acetaminophen     Tablet .. 975 milliGRAM(s) Oral every 6 hours  chlorhexidine 2% Cloths 1 Application(s) Topical <User Schedule>  ertapenem  IVPB 1000 milliGRAM(s) IV Intermittent every 24 hours  ertapenem  IVPB      finasteride 5 milliGRAM(s) Oral daily  heparin   Injectable 5000 Unit(s) SubCutaneous every 8 hours  losartan 100 milliGRAM(s) Oral daily      PHYSICAL EXAM:  T(C): 36.7 (08-12-22 @ 08:26), Max: 37.4 (08-11-22 @ 12:27)  HR: 70 (08-12-22 @ 08:26) (69 - 81)  BP: 143/78 (08-12-22 @ 08:26) (119/65 - 144/96)  RR: 18 (08-12-22 @ 08:26) (18 - 18)  SpO2: 94% (08-12-22 @ 08:26) (93% - 96%)  Wt(kg): --  I&O's Summary    11 Aug 2022 07:01  -  12 Aug 2022 07:00  --------------------------------------------------------  IN: 1335 mL / OUT: 1275 mL / NET: 60 mL          Appearance: Normal	  HEENT:   Normal oral mucosa, PERRL, EOMI	  Lymphatic: No lymphadenopathy , no edema  Cardiovascular: Normal S1 S2, No JVD, No murmurs , Peripheral pulses palpable 2+ bilaterally  Respiratory: Lungs clear to auscultation, normal effort 	  Gastrointestinal:  Soft, Non-tender, + BS	  Skin: No rashes, No ecchymoses, No cyanosis, warm to touch  Musculoskeletal: Normal range of motion, normal strength  Psychiatry:  Mood & affect appropriate  Ext: No edema      LABS:    CARDIAC MARKERS:                                9.5    11.01 )-----------( 196      ( 12 Aug 2022 07:19 )             28.9     08-12    134<L>  |  102  |  10  ----------------------------<  101<H>  3.9   |  24  |  0.92    Ca    7.8<L>      12 Aug 2022 07:19  Phos  2.5     08-12  Mg     1.9     08-12      proBNP:   Lipid Profile:   HgA1c:   TSH:             TELEMETRY: 	    ECG:  	  RADIOLOGY:   DIAGNOSTIC TESTING:  [ ] Echocardiogram:  [ ]  Catheterization:  [ ] Stress Test:    OTHER:

## 2022-08-12 NOTE — DISCHARGE NOTE NURSING/CASE MANAGEMENT/SOCIAL WORK - NSDCPEFALRISK_GEN_ALL_CORE
For information on Fall & Injury Prevention, visit: [email or web address] OR  [email or web address] OR  https://www.cdc.gov/steadi/patient.html

## 2022-08-12 NOTE — PROGRESS NOTE ADULT - ASSESSMENT
71yo M w/ hx of HTN, BPH, kidney stones that presents with  waxing and waning fatigue for approximately 3-4 weeks, loss of appetite and 10-15 unintentional weight loss. Over the past 24 hrs, he developed RLQ abdominal pain, fevers and chills. Labs remarkable for leukocytosis to 11, and CT scan consistent with perforated appendicitis.

## 2022-08-13 LAB
ANION GAP SERPL CALC-SCNC: 12 MMOL/L — SIGNIFICANT CHANGE UP (ref 5–17)
BUN SERPL-MCNC: 10 MG/DL — SIGNIFICANT CHANGE UP (ref 7–23)
CALCIUM SERPL-MCNC: 8 MG/DL — LOW (ref 8.4–10.5)
CHLORIDE SERPL-SCNC: 100 MMOL/L — SIGNIFICANT CHANGE UP (ref 96–108)
CO2 SERPL-SCNC: 22 MMOL/L — SIGNIFICANT CHANGE UP (ref 22–31)
CREAT SERPL-MCNC: 0.94 MG/DL — SIGNIFICANT CHANGE UP (ref 0.5–1.3)
EGFR: 87 ML/MIN/1.73M2 — SIGNIFICANT CHANGE UP
GLUCOSE SERPL-MCNC: 103 MG/DL — HIGH (ref 70–99)
HCT VFR BLD CALC: 29.6 % — LOW (ref 39–50)
HGB BLD-MCNC: 9.7 G/DL — LOW (ref 13–17)
MAGNESIUM SERPL-MCNC: 1.9 MG/DL — SIGNIFICANT CHANGE UP (ref 1.6–2.6)
MCHC RBC-ENTMCNC: 30.9 PG — SIGNIFICANT CHANGE UP (ref 27–34)
MCHC RBC-ENTMCNC: 32.8 GM/DL — SIGNIFICANT CHANGE UP (ref 32–36)
MCV RBC AUTO: 94.3 FL — SIGNIFICANT CHANGE UP (ref 80–100)
NRBC # BLD: 0 /100 WBCS — SIGNIFICANT CHANGE UP (ref 0–0)
PHOSPHATE SERPL-MCNC: 2.2 MG/DL — LOW (ref 2.5–4.5)
PLATELET # BLD AUTO: 238 K/UL — SIGNIFICANT CHANGE UP (ref 150–400)
POTASSIUM SERPL-MCNC: 3.8 MMOL/L — SIGNIFICANT CHANGE UP (ref 3.5–5.3)
POTASSIUM SERPL-SCNC: 3.8 MMOL/L — SIGNIFICANT CHANGE UP (ref 3.5–5.3)
RBC # BLD: 3.14 M/UL — LOW (ref 4.2–5.8)
RBC # FLD: 13.7 % — SIGNIFICANT CHANGE UP (ref 10.3–14.5)
SODIUM SERPL-SCNC: 134 MMOL/L — LOW (ref 135–145)
WBC # BLD: 12.11 K/UL — HIGH (ref 3.8–10.5)
WBC # FLD AUTO: 12.11 K/UL — HIGH (ref 3.8–10.5)

## 2022-08-13 RX ORDER — SODIUM,POTASSIUM PHOSPHATES 278-250MG
1 POWDER IN PACKET (EA) ORAL ONCE
Refills: 0 | Status: COMPLETED | OUTPATIENT
Start: 2022-08-13 | End: 2022-08-13

## 2022-08-13 RX ORDER — MAGNESIUM SULFATE 500 MG/ML
1 VIAL (ML) INJECTION ONCE
Refills: 0 | Status: COMPLETED | OUTPATIENT
Start: 2022-08-13 | End: 2022-08-13

## 2022-08-13 RX ADMIN — HEPARIN SODIUM 5000 UNIT(S): 5000 INJECTION INTRAVENOUS; SUBCUTANEOUS at 23:03

## 2022-08-13 RX ADMIN — Medication 975 MILLIGRAM(S): at 13:00

## 2022-08-13 RX ADMIN — Medication 100 GRAM(S): at 15:03

## 2022-08-13 RX ADMIN — Medication 975 MILLIGRAM(S): at 18:32

## 2022-08-13 RX ADMIN — ERTAPENEM SODIUM 120 MILLIGRAM(S): 1 INJECTION, POWDER, LYOPHILIZED, FOR SOLUTION INTRAMUSCULAR; INTRAVENOUS at 12:50

## 2022-08-13 RX ADMIN — HEPARIN SODIUM 5000 UNIT(S): 5000 INJECTION INTRAVENOUS; SUBCUTANEOUS at 15:03

## 2022-08-13 RX ADMIN — Medication 975 MILLIGRAM(S): at 23:03

## 2022-08-13 RX ADMIN — Medication 1 TABLET(S): at 12:50

## 2022-08-13 RX ADMIN — FINASTERIDE 5 MILLIGRAM(S): 5 TABLET, FILM COATED ORAL at 12:50

## 2022-08-13 RX ADMIN — CHLORHEXIDINE GLUCONATE 1 APPLICATION(S): 213 SOLUTION TOPICAL at 12:52

## 2022-08-13 RX ADMIN — LOSARTAN POTASSIUM 100 MILLIGRAM(S): 100 TABLET, FILM COATED ORAL at 05:35

## 2022-08-13 RX ADMIN — HEPARIN SODIUM 5000 UNIT(S): 5000 INJECTION INTRAVENOUS; SUBCUTANEOUS at 05:35

## 2022-08-13 RX ADMIN — Medication 975 MILLIGRAM(S): at 23:10

## 2022-08-13 RX ADMIN — Medication 975 MILLIGRAM(S): at 19:02

## 2022-08-13 RX ADMIN — Medication 975 MILLIGRAM(S): at 12:50

## 2022-08-13 NOTE — PROGRESS NOTE ADULT - SUBJECTIVE AND OBJECTIVE BOX
Red team Surgery Progress Note    INTERVAL EVENTS:   No acute events overnight.    SUBJECTIVE: Patient seen and examined at bedside with surgical team,     OBJECTIVE:    Vital Signs Last 24 Hrs  T(C): 36.7 (12 Aug 2022 21:25), Max: 37.2 (12 Aug 2022 12:48)  T(F): 98 (12 Aug 2022 21:25), Max: 98.9 (12 Aug 2022 12:48)  HR: 86 (12 Aug 2022 21:25) (70 - 86)  BP: 147/87 (12 Aug 2022 21:25) (119/65 - 147/87)  BP(mean): --  RR: 18 (12 Aug 2022 21:25) (18 - 18)  SpO2: 94% (12 Aug 2022 21:25) (94% - 96%)    Parameters below as of 12 Aug 2022 21:25  Patient On (Oxygen Delivery Method): room air    I&O's Detail    11 Aug 2022 07:01  -  12 Aug 2022 07:00  --------------------------------------------------------  IN:    IV PiggyBack: 50 mL    Lactated Ringers: 700 mL    Oral Fluid: 585 mL  Total IN: 1335 mL    OUT:    Voided (mL): 1275 mL  Total OUT: 1275 mL    Total NET: 60 mL      12 Aug 2022 07:01  -  13 Aug 2022 00:32  --------------------------------------------------------  IN:    IV PiggyBack: 50 mL    Oral Fluid: 325 mL  Total IN: 375 mL    OUT:    Voided (mL): 450 mL  Total OUT: 450 mL    Total NET: -75 mL      MEDICATIONS  (STANDING):  acetaminophen     Tablet .. 975 milliGRAM(s) Oral every 6 hours  chlorhexidine 2% Cloths 1 Application(s) Topical <User Schedule>  ertapenem  IVPB      ertapenem  IVPB 1000 milliGRAM(s) IV Intermittent every 24 hours  finasteride 5 milliGRAM(s) Oral daily  heparin   Injectable 5000 Unit(s) SubCutaneous every 8 hours  losartan 100 milliGRAM(s) Oral daily    MEDICATIONS  (PRN):  oxyCODONE    IR 5 milliGRAM(s) Oral every 4 hours PRN Severe Pain (7 - 10)  oxyCODONE    IR 2.5 milliGRAM(s) Oral every 4 hours PRN Moderate Pain (4 - 6)      PHYSICAL EXAM:  Constitutional: A&Ox3, NAD  Respiratory: Unlabored breathing  Abdomen:?????  Extremities: WWP, CALERO spontaneously    LABS:                        9.5    11.01 )-----------( 196      ( 12 Aug 2022 07:19 )             28.9     08-12    134<L>  |  102  |  10  ----------------------------<  101<H>  3.9   |  24  |  0.92    Ca    7.8<L>      12 Aug 2022 07:19  Phos  2.5     08-12  Mg     1.9     08-12      PT/INR - ( 12 Aug 2022 07:20 )   PT: 14.3 sec;   INR: 1.24 ratio         PTT - ( 12 Aug 2022 07:20 )  PTT:31.3 sec      ABO Interpretation: A (08-12-22 @ 08:44)      IMAGING:     Red team Surgery Progress Note    INTERVAL EVENTS:   No acute events overnight.    SUBJECTIVE: Patient seen and examined at bedside with surgical team.    OBJECTIVE:    Vital Signs Last 24 Hrs  T(C): 36.7 (12 Aug 2022 21:25), Max: 37.2 (12 Aug 2022 12:48)  T(F): 98 (12 Aug 2022 21:25), Max: 98.9 (12 Aug 2022 12:48)  HR: 86 (12 Aug 2022 21:25) (70 - 86)  BP: 147/87 (12 Aug 2022 21:25) (119/65 - 147/87)  BP(mean): --  RR: 18 (12 Aug 2022 21:25) (18 - 18)  SpO2: 94% (12 Aug 2022 21:25) (94% - 96%)    Parameters below as of 12 Aug 2022 21:25  Patient On (Oxygen Delivery Method): room air    I&O's Detail    11 Aug 2022 07:01  -  12 Aug 2022 07:00  --------------------------------------------------------  IN:    IV PiggyBack: 50 mL    Lactated Ringers: 700 mL    Oral Fluid: 585 mL  Total IN: 1335 mL    OUT:    Voided (mL): 1275 mL  Total OUT: 1275 mL    Total NET: 60 mL      12 Aug 2022 07:01  -  13 Aug 2022 00:32  --------------------------------------------------------  IN:    IV PiggyBack: 50 mL    Oral Fluid: 325 mL  Total IN: 375 mL    OUT:    Voided (mL): 450 mL  Total OUT: 450 mL    Total NET: -75 mL      MEDICATIONS  (STANDING):  acetaminophen     Tablet .. 975 milliGRAM(s) Oral every 6 hours  chlorhexidine 2% Cloths 1 Application(s) Topical <User Schedule>  ertapenem  IVPB      ertapenem  IVPB 1000 milliGRAM(s) IV Intermittent every 24 hours  finasteride 5 milliGRAM(s) Oral daily  heparin   Injectable 5000 Unit(s) SubCutaneous every 8 hours  losartan 100 milliGRAM(s) Oral daily    MEDICATIONS  (PRN):  oxyCODONE    IR 5 milliGRAM(s) Oral every 4 hours PRN Severe Pain (7 - 10)  oxyCODONE    IR 2.5 milliGRAM(s) Oral every 4 hours PRN Moderate Pain (4 - 6)      PHYSICAL EXAM:  Constitutional: A&Ox3, NAD  Respiratory: Unlabored breathing  Abdomen: NT,ND.  Extremities: WWP, CALERO spontaneously    LABS:                        9.5    11.01 )-----------( 196      ( 12 Aug 2022 07:19 )             28.9     08-12    134<L>  |  102  |  10  ----------------------------<  101<H>  3.9   |  24  |  0.92    Ca    7.8<L>      12 Aug 2022 07:19  Phos  2.5     08-12  Mg     1.9     08-12      PT/INR - ( 12 Aug 2022 07:20 )   PT: 14.3 sec;   INR: 1.24 ratio         PTT - ( 12 Aug 2022 07:20 )  PTT:31.3 sec      ABO Interpretation: A (08-12-22 @ 08:44)      IMAGING:     Red team Surgery Progress Note    INTERVAL EVENTS:   No acute events overnight.    SUBJECTIVE: Patient seen and examined at bedside with surgical team.    OBJECTIVE:    ICU Vital Signs Last 24 Hrs  T(C): 36.7 (13 Aug 2022 08:29), Max: 37.2 (12 Aug 2022 12:48)  T(F): 98 (13 Aug 2022 08:29), Max: 98.9 (12 Aug 2022 12:48)  HR: 73 (13 Aug 2022 08:29) (71 - 86)  BP: 148/82 (13 Aug 2022 08:29) (140/81 - 148/82)  BP(mean): --  ABP: --  ABP(mean): --  RR: 18 (13 Aug 2022 08:29) (18 - 18)  SpO2: 93% (13 Aug 2022 08:29) (91% - 95%)    O2 Parameters below as of 13 Aug 2022 08:29  Patient On (Oxygen Delivery Method): room air        I&O's Detail    12 Aug 2022 07:01  -  13 Aug 2022 07:00  --------------------------------------------------------  IN:    IV PiggyBack: 50 mL    Oral Fluid: 325 mL  Total IN: 375 mL    OUT:    Voided (mL): 450 mL  Total OUT: 450 mL    Total NET: -75 mL          MEDICATIONS  (STANDING):  acetaminophen     Tablet .. 975 milliGRAM(s) Oral every 6 hours  chlorhexidine 2% Cloths 1 Application(s) Topical <User Schedule>  ertapenem  IVPB      ertapenem  IVPB 1000 milliGRAM(s) IV Intermittent every 24 hours  finasteride 5 milliGRAM(s) Oral daily  heparin   Injectable 5000 Unit(s) SubCutaneous every 8 hours  losartan 100 milliGRAM(s) Oral daily    MEDICATIONS  (PRN):  oxyCODONE    IR 5 milliGRAM(s) Oral every 4 hours PRN Severe Pain (7 - 10)  oxyCODONE    IR 2.5 milliGRAM(s) Oral every 4 hours PRN Moderate Pain (4 - 6)      PHYSICAL EXAM:  Constitutional: A&Ox3, NAD  Respiratory: Unlabored breathing  Abdomen: NT,ND.  Extremities: WWP, CALERO spontaneously    LABS:                          9.7    12.11 )-----------( 238      ( 13 Aug 2022 05:59 )             29.6     08-13    134<L>  |  100  |  10  ----------------------------<  103<H>  3.8   |  22  |  0.94    Ca    8.0<L>      13 Aug 2022 05:58  Phos  2.2     08-13  Mg     1.9     08-13        PT/INR - ( 12 Aug 2022 07:20 )   PT: 14.3 sec;   INR: 1.24 ratio         PTT - ( 12 Aug 2022 07:20 )  PTT:31.3 sec      ABO Interpretation: A (08-12-22 @ 08:44)      IMAGING:

## 2022-08-13 NOTE — PROVIDER CONTACT NOTE (OTHER) - BACKGROUND
Pt recently had diet advanced from clear liquids to solids s/p eval by Dr Kilpatrick. Pt has had two bowel movements today, soft and liquid in consistency.
acute perf. appendicitis

## 2022-08-13 NOTE — PROGRESS NOTE ADULT - ASSESSMENT
Patient is a 71yo M w/ hx of HTN, BPH, kidney stones that presents with  waxing and waning fatigue for approximately 3-4 weeks, loss of appetite and 10-15 unintentional weight loss. Over the past 24 hrs, he developed RLQ abdominal pain, fevers and chills. Labs remarkable for leukocytosis to 11, and CT scan consistent with perforated appendicitis. CT on08/10 Redemonstrated appendicitis with signs of local perforation. The appendix and adjacent fluid are slightly increased in size since 8/5/2022.    Plan:    - LRD diet   - ID recs: d/c on Invanz, got midline  - monitor for fevers  - Pain control prn  - on home meds   - AM labs          Red Surgery  [id number]

## 2022-08-13 NOTE — CHART NOTE - NSCHARTNOTEFT_GEN_A_CORE
Provider called regarding new onset b/l lower extremity pitting edema. Patient seen and evaluated at bedside. He reports that he has no cardiac or renal history and has not had LE edema in the past. States that the edema started today but has not had any pain.     Physical:   General: patient resting comfortably in bed, NAD  Extremities: b/l 2+ pitting edema of the ankles to mid sal, -Kiera's sign, no erythema Provider called regarding new onset b/l lower extremity pitting edema. Patient seen and evaluated at bedside. He reports that he has no cardiac or renal history and has not had LE edema in the past. States that the edema started today but has not had any pain. Further reports a gradual, unintentional 10-15 lbs weight loss in the past 2 months down to the 180s, but weighed around 210lbs today. He thinks he is retaining fluids and that has caused his swelling and weight gain.    Physical:   General: patient resting comfortably in bed, NAD  Extremities: b/l 2+ pitting edema of the ankles to mid shin, -Homans sign, no erythema, no pain on palpation    A/P:  Patient likely retaining fluids as they were dc'ed recently. Takes HCTZ inconsistently at home for HTN, but hasn't taken it in a few days.    - elevated patients legs to assist with edema  - will report possible restarting of HCTZ with primary team tomorrow

## 2022-08-13 NOTE — PROGRESS NOTE ADULT - SUBJECTIVE AND OBJECTIVE BOX
Name of Patient : BREN HARVEY  MRN: [id number]  Date of visit: 08-13-22       Subjective: Patient seen and examined. No new events except as noted.       REVIEW OF SYSTEMS:    CONSTITUTIONAL: No weakness, fevers or chills  EYES/ENT: No visual changes;  No vertigo or throat pain   NECK: No pain or stiffness  RESPIRATORY: No cough, wheezing, hemoptysis; No shortness of breath  CARDIOVASCULAR: No chest pain or palpitations  GASTROINTESTINAL: No abdominal or epigastric pain.   GENITOURINARY: No dysuria, frequency or hematuria  NEUROLOGICAL: No numbness or weakness  SKIN: No itching, burning, rashes, or lesions   All other review of systems is negative unless indicated above.    MEDICATIONS:  MEDICATIONS  (STANDING):  acetaminophen     Tablet .. 975 milliGRAM(s) Oral every 6 hours  chlorhexidine 2% Cloths 1 Application(s) Topical <User Schedule>  ertapenem  IVPB 1000 milliGRAM(s) IV Intermittent every 24 hours  ertapenem  IVPB      finasteride 5 milliGRAM(s) Oral daily  heparin   Injectable 5000 Unit(s) SubCutaneous every 8 hours  losartan 100 milliGRAM(s) Oral daily      PHYSICAL EXAM:  T(C): 36.9 (08-13-22 @ 20:45), Max: 37 (08-13-22 @ 00:44)  HR: 76 (08-13-22 @ 20:45) (73 - 85)  BP: 137/93 (08-13-22 @ 20:45) (137/93 - 148/82)  RR: 18 (08-13-22 @ 20:45) (18 - 18)  SpO2: 96% (08-13-22 @ 20:45) (91% - 97%)  Wt(kg): --  I&O's Summary    12 Aug 2022 07:01  -  13 Aug 2022 07:00  --------------------------------------------------------  IN: 375 mL / OUT: 450 mL / NET: -75 mL    13 Aug 2022 07:01  -  13 Aug 2022 22:50  --------------------------------------------------------  IN: 590 mL / OUT: 0 mL / NET: 590 mL          Appearance: Normal	  HEENT:  PERRLA   Lymphatic: No lymphadenopathy   Cardiovascular: Normal S1 S2, no JVD  Respiratory: normal effort , clear  Gastrointestinal:  Soft, Non-tender  Skin: No rashes,  warm to touch  Psychiatry:  Mood & affect appropriate  Musculuskeletal: No edema      All labs, Imaging and EKGs personally reviewed       08-12-22 @ 07:01  -  08-13-22 @ 07:00  --------------------------------------------------------  IN: 375 mL / OUT: 450 mL / NET: -75 mL    08-13-22 @ 07:01  -  08-13-22 @ 22:50  --------------------------------------------------------  IN: 590 mL / OUT: 0 mL / NET: 590 mL                            9.7    12.11 )-----------( 238      ( 13 Aug 2022 05:59 )             29.6               08-13    134<L>  |  100  |  10  ----------------------------<  103<H>  3.8   |  22  |  0.94    Ca    8.0<L>      13 Aug 2022 05:58  Phos  2.2     08-13  Mg     1.9     08-13      PT/INR - ( 12 Aug 2022 07:20 )   PT: 14.3 sec;   INR: 1.24 ratio         PTT - ( 12 Aug 2022 07:20 )  PTT:31.3 sec

## 2022-08-13 NOTE — PROGRESS NOTE ADULT - SUBJECTIVE AND OBJECTIVE BOX
Subjective: Patient seen and examined. No new events except as noted.     REVIEW OF SYSTEMS:    CONSTITUTIONAL: + weakness, fevers or chills  EYES/ENT: No visual changes;  No vertigo or throat pain   NECK: No pain or stiffness  RESPIRATORY: No cough, wheezing, hemoptysis; No shortness of breath  CARDIOVASCULAR: No chest pain or palpitations  GASTROINTESTINAL: No abdominal or epigastric pain. No nausea, vomiting, or hematemesis; No diarrhea or constipation. No melena or hematochezia.  GENITOURINARY: No dysuria, frequency or hematuria  NEUROLOGICAL: No numbness or weakness  SKIN: No itching, burning, rashes, or lesions   All other review of systems is negative unless indicated above.    MEDICATIONS:  MEDICATIONS  (STANDING):  acetaminophen     Tablet .. 975 milliGRAM(s) Oral every 6 hours  chlorhexidine 2% Cloths 1 Application(s) Topical <User Schedule>  ertapenem  IVPB 1000 milliGRAM(s) IV Intermittent every 24 hours  ertapenem  IVPB      finasteride 5 milliGRAM(s) Oral daily  heparin   Injectable 5000 Unit(s) SubCutaneous every 8 hours  losartan 100 milliGRAM(s) Oral daily    PHYSICAL EXAM:  T(C): 36.8 (08-13-22 @ 05:02), Max: 37.2 (08-12-22 @ 12:48)  HR: 80 (08-13-22 @ 05:02) (70 - 86)  BP: 140/84 (08-13-22 @ 05:02) (140/81 - 147/87)  RR: 18 (08-13-22 @ 05:02) (18 - 18)  SpO2: 92% (08-13-22 @ 05:02) (91% - 95%)  Wt(kg): --  I&O's Summary    11 Aug 2022 07:01  -  12 Aug 2022 07:00  --------------------------------------------------------  IN: 1335 mL / OUT: 1275 mL / NET: 60 mL    12 Aug 2022 07:01  -  13 Aug 2022 06:52  --------------------------------------------------------  IN: 375 mL / OUT: 450 mL / NET: -75 mL    Appearance: NAD  HEENT: Normal oral mucosa, PERRL, EOMI	  Lymphatic: No lymphadenopathy , no edema  Cardiovascular: Normal S1 S2, No JVD, No murmurs , Peripheral pulses palpable 2+ bilaterally  Respiratory: Lungs clear to auscultation, normal effort 	  Gastrointestinal:  Soft, Non-tender, + BS	  Skin: No rashes, No ecchymoses, No cyanosis, warm to touch  Musculoskeletal: Normal range of motion, normal strength  Psychiatry: Mood & affect appropriate  Ext: No edema    LABS:    CARDIAC MARKERS:                       9.7    12.11 )-----------( 238      ( 13 Aug 2022 05:59 )             29.6     08-13    134<L>  |  100  |  10  ----------------------------<  103<H>  3.8   |  22  |  0.94    Ca    8.0<L>      13 Aug 2022 05:58  Phos  2.2     08-13  Mg     1.9     08-13    proBNP:   Lipid Profile:   HgA1c:   TSH:     TELEMETRY: 	    ECG:  	  RADIOLOGY:   DIAGNOSTIC TESTING:  [ ] Echocardiogram:  [ ]  Catheterization:  [ ] Stress Test:    OTHER:

## 2022-08-14 LAB
ANION GAP SERPL CALC-SCNC: 12 MMOL/L — SIGNIFICANT CHANGE UP (ref 5–17)
BUN SERPL-MCNC: 11 MG/DL — SIGNIFICANT CHANGE UP (ref 7–23)
CALCIUM SERPL-MCNC: 8.5 MG/DL — SIGNIFICANT CHANGE UP (ref 8.4–10.5)
CHLORIDE SERPL-SCNC: 102 MMOL/L — SIGNIFICANT CHANGE UP (ref 96–108)
CO2 SERPL-SCNC: 24 MMOL/L — SIGNIFICANT CHANGE UP (ref 22–31)
CREAT SERPL-MCNC: 1.03 MG/DL — SIGNIFICANT CHANGE UP (ref 0.5–1.3)
EGFR: 78 ML/MIN/1.73M2 — SIGNIFICANT CHANGE UP
GLUCOSE SERPL-MCNC: 92 MG/DL — SIGNIFICANT CHANGE UP (ref 70–99)
HCT VFR BLD CALC: 34.7 % — LOW (ref 39–50)
HGB BLD-MCNC: 11.1 G/DL — LOW (ref 13–17)
MAGNESIUM SERPL-MCNC: 2.3 MG/DL — SIGNIFICANT CHANGE UP (ref 1.6–2.6)
MCHC RBC-ENTMCNC: 30.9 PG — SIGNIFICANT CHANGE UP (ref 27–34)
MCHC RBC-ENTMCNC: 32 GM/DL — SIGNIFICANT CHANGE UP (ref 32–36)
MCV RBC AUTO: 96.7 FL — SIGNIFICANT CHANGE UP (ref 80–100)
NRBC # BLD: 0 /100 WBCS — SIGNIFICANT CHANGE UP (ref 0–0)
PHOSPHATE SERPL-MCNC: 3.1 MG/DL — SIGNIFICANT CHANGE UP (ref 2.5–4.5)
PLATELET # BLD AUTO: 257 K/UL — SIGNIFICANT CHANGE UP (ref 150–400)
POTASSIUM SERPL-MCNC: 4 MMOL/L — SIGNIFICANT CHANGE UP (ref 3.5–5.3)
POTASSIUM SERPL-SCNC: 4 MMOL/L — SIGNIFICANT CHANGE UP (ref 3.5–5.3)
RBC # BLD: 3.59 M/UL — LOW (ref 4.2–5.8)
RBC # FLD: 13.9 % — SIGNIFICANT CHANGE UP (ref 10.3–14.5)
SODIUM SERPL-SCNC: 138 MMOL/L — SIGNIFICANT CHANGE UP (ref 135–145)
WBC # BLD: 11.42 K/UL — HIGH (ref 3.8–10.5)
WBC # FLD AUTO: 11.42 K/UL — HIGH (ref 3.8–10.5)

## 2022-08-14 PROCEDURE — 71045 X-RAY EXAM CHEST 1 VIEW: CPT | Mod: 26

## 2022-08-14 RX ORDER — HYDROCHLOROTHIAZIDE 25 MG
12.5 TABLET ORAL ONCE
Refills: 0 | Status: COMPLETED | OUTPATIENT
Start: 2022-08-14 | End: 2022-08-14

## 2022-08-14 RX ADMIN — Medication 975 MILLIGRAM(S): at 06:45

## 2022-08-14 RX ADMIN — HEPARIN SODIUM 5000 UNIT(S): 5000 INJECTION INTRAVENOUS; SUBCUTANEOUS at 14:51

## 2022-08-14 RX ADMIN — ERTAPENEM SODIUM 120 MILLIGRAM(S): 1 INJECTION, POWDER, LYOPHILIZED, FOR SOLUTION INTRAMUSCULAR; INTRAVENOUS at 12:20

## 2022-08-14 RX ADMIN — Medication 975 MILLIGRAM(S): at 12:18

## 2022-08-14 RX ADMIN — FINASTERIDE 5 MILLIGRAM(S): 5 TABLET, FILM COATED ORAL at 12:18

## 2022-08-14 RX ADMIN — HEPARIN SODIUM 5000 UNIT(S): 5000 INJECTION INTRAVENOUS; SUBCUTANEOUS at 21:10

## 2022-08-14 RX ADMIN — LOSARTAN POTASSIUM 100 MILLIGRAM(S): 100 TABLET, FILM COATED ORAL at 05:31

## 2022-08-14 RX ADMIN — Medication 975 MILLIGRAM(S): at 19:06

## 2022-08-14 RX ADMIN — Medication 975 MILLIGRAM(S): at 05:31

## 2022-08-14 RX ADMIN — HEPARIN SODIUM 5000 UNIT(S): 5000 INJECTION INTRAVENOUS; SUBCUTANEOUS at 05:31

## 2022-08-14 RX ADMIN — Medication 12.5 MILLIGRAM(S): at 07:44

## 2022-08-14 NOTE — PROGRESS NOTE ADULT - SUBJECTIVE AND OBJECTIVE BOX
Name of Patient : BREN HARVEY  MRN: [id number]  Date of visit: 08-14-22       Subjective: Patient seen and examined. No new events except as noted.   doing okay     REVIEW OF SYSTEMS:    CONSTITUTIONAL: No weakness, fevers or chills  EYES/ENT: No visual changes;  No vertigo or throat pain   NECK: No pain or stiffness  RESPIRATORY: No cough, wheezing, hemoptysis; No shortness of breath  CARDIOVASCULAR: No chest pain or palpitations  GASTROINTESTINAL: No abdominal or epigastric pain  GENITOURINARY: No dysuria, frequency or hematuria  NEUROLOGICAL: No numbness or weakness  SKIN: No itching, burning, rashes, or lesions   All other review of systems is negative unless indicated above.    MEDICATIONS:  MEDICATIONS  (STANDING):  acetaminophen     Tablet .. 975 milliGRAM(s) Oral every 6 hours  chlorhexidine 2% Cloths 1 Application(s) Topical <User Schedule>  ertapenem  IVPB      ertapenem  IVPB 1000 milliGRAM(s) IV Intermittent every 24 hours  finasteride 5 milliGRAM(s) Oral daily  heparin   Injectable 5000 Unit(s) SubCutaneous every 8 hours  losartan 100 milliGRAM(s) Oral daily      PHYSICAL EXAM:  T(C): 37.8 (08-14-22 @ 20:34), Max: 37.8 (08-14-22 @ 20:34)  HR: 85 (08-14-22 @ 20:34) (74 - 88)  BP: 138/81 (08-14-22 @ 20:34) (124/75 - 152/95)  RR: 18 (08-14-22 @ 20:34) (18 - 19)  SpO2: 95% (08-14-22 @ 20:34) (90% - 97%)  Wt(kg): --  I&O's Summary    13 Aug 2022 07:01  -  14 Aug 2022 07:00  --------------------------------------------------------  IN: 1030 mL / OUT: 0 mL / NET: 1030 mL    14 Aug 2022 07:01  -  14 Aug 2022 23:27  --------------------------------------------------------  IN: 310 mL / OUT: 0 mL / NET: 310 mL          Appearance: Normal	  HEENT:  PERRLA   Lymphatic: No lymphadenopathy   Cardiovascular: Normal S1 S2, no JVD  Respiratory: normal effort , clear  Gastrointestinal:  Soft, Non-tender  Skin: No rashes,  warm to touch  Psychiatry:  Mood & affect appropriate  Musculuskeletal: No edema      All labs, Imaging and EKGs personally reviewed       08-13-22 @ 07:01  -  08-14-22 @ 07:00  --------------------------------------------------------  IN: 1030 mL / OUT: 0 mL / NET: 1030 mL    08-14-22 @ 07:01  -  08-14-22 @ 23:27  --------------------------------------------------------  IN: 310 mL / OUT: 0 mL / NET: 310 mL                            11.1   11.42 )-----------( 257      ( 14 Aug 2022 07:10 )             34.7               08-14    138  |  102  |  11  ----------------------------<  92  4.0   |  24  |  1.03    Ca    8.5      14 Aug 2022 07:12  Phos  3.1     08-14  Mg     2.3     08-14

## 2022-08-14 NOTE — PROVIDER CONTACT NOTE (OTHER) - ASSESSMENT
pt c/o BL LE swelling new onset since yesterday. does not have difficulty ambulating. +2edema noted on BL LE legs, ankles & feet. denies numbness/tingling/pain. VS WNL
denies SOB,angina,dyspnea,pain. HOB elevated, IS encouraged. SpO2 on room air remains 87-90%
Pt received IV Tylenol recently and refused oral oxy as PRN med on board. PT requested to see physician and above named MD to bedside. Stat bladder scan ordered and completed with less than 20cc of volume detected.

## 2022-08-14 NOTE — CHART NOTE - NSCHARTNOTEFT_GEN_A_CORE
Provider paged by RN for patient desatting to 90% while sleeping. He remained asymptomatic throughout event, all other vital signs remained stable. Patient placed one 2L NC and saturation increased to 98%.     Physical:  general: resting comfortably in bed, NAD  Resp: b/l equal, vesicular breath sounds without wheezing, rales, or rhonchi   extremities: b/l pitting edema improved from previous exam    A/P: Patient has hx of mild VILMA, likely a contributing factor  - NC PRN for desaturations, SOB, and increased WOB

## 2022-08-14 NOTE — PROGRESS NOTE ADULT - SUBJECTIVE AND OBJECTIVE BOX
Subjective: Patient seen and examined. No new events except as noted.   patient desatting to 90% while sleeping  REVIEW OF SYSTEMS:    CONSTITUTIONAL: No weakness, fevers or chills  EYES/ENT: No visual changes;  No vertigo or throat pain   NECK: No pain or stiffness  RESPIRATORY: No cough, wheezing, hemoptysis; No shortness of breath  CARDIOVASCULAR: No chest pain or palpitations  GASTROINTESTINAL: No abdominal or epigastric pain. No nausea, vomiting, or hematemesis; No diarrhea or constipation. No melena or hematochezia.  GENITOURINARY: No dysuria, frequency or hematuria  NEUROLOGICAL: No numbness or weakness  SKIN: No itching, burning, rashes, or lesions   All other review of systems is negative unless indicated above.    MEDICATIONS:  MEDICATIONS  (STANDING):  acetaminophen     Tablet .. 975 milliGRAM(s) Oral every 6 hours  chlorhexidine 2% Cloths 1 Application(s) Topical <User Schedule>  ertapenem  IVPB      ertapenem  IVPB 1000 milliGRAM(s) IV Intermittent every 24 hours  finasteride 5 milliGRAM(s) Oral daily  heparin   Injectable 5000 Unit(s) SubCutaneous every 8 hours  losartan 100 milliGRAM(s) Oral daily      PHYSICAL EXAM:  T(C): 36.3 (08-14-22 @ 05:05), Max: 36.9 (08-13-22 @ 13:19)  HR: 88 (08-14-22 @ 05:10) (73 - 88)  BP: 152/95 (08-14-22 @ 05:05) (135/73 - 152/95)  RR: 19 (08-14-22 @ 05:10) (18 - 19)  SpO2: 97% (08-14-22 @ 05:10) (90% - 97%)  Wt(kg): --  I&O's Summary    13 Aug 2022 07:01  -  14 Aug 2022 07:00  --------------------------------------------------------  IN: 1030 mL / OUT: 0 mL / NET: 1030 mL          Appearance: NAD 2liters NC   HEENT:   Normal oral mucosa, PERRL, EOMI	  Lymphatic: No lymphadenopathy , no edema  Cardiovascular: Normal S1 S2, No JVD, No murmurs , Peripheral pulses palpable 2+ bilaterally  Respiratory: Lungs clear to auscultation, normal effort 	  Gastrointestinal:  Soft, Non-tender, + BS	  Skin: No rashes, No ecchymoses, No cyanosis, warm to touch  Musculoskeletal: Normal range of motion, normal strength  Psychiatry:  Mood & affect appropriate  Ext: No edema      LABS:    CARDIAC MARKERS:                                11.1   11.42 )-----------( 257      ( 14 Aug 2022 07:10 )             34.7     08-14    138  |  102  |  11  ----------------------------<  92  4.0   |  24  |  1.03    Ca    8.5      14 Aug 2022 07:12  Phos  3.1     08-14  Mg     2.3     08-14      proBNP:   Lipid Profile:   HgA1c:   TSH:             TELEMETRY: 	    ECG:  	  RADIOLOGY:   DIAGNOSTIC TESTING:  [ ] Echocardiogram:  [ ]  Catheterization:  [ ] Stress Test:    OTHER:

## 2022-08-14 NOTE — PROVIDER CONTACT NOTE (OTHER) - RECOMMENDATIONS
2L NC
Pt ambulating today and voiding without c/o pain. Pt did tolerate clear diet well today, and with dinner being first meal pt unable to eat due to c/o stomach complaint. Pt denies nausea at this time.
MD notified & aware and assess at bedside

## 2022-08-14 NOTE — PROVIDER CONTACT NOTE (OTHER) - ACTION/TREATMENT ORDERED:
2L NC O2 97%, in no acute distress, sleeping comfortably, will continue to monitor
continue to elevate BL LE and continue to monitor
No further instructions provided at this time. Pt laying on his side, afebrile, VSKIRSTY LANCE to f/u with senior resident for plan of care. Pt updated by RN

## 2022-08-14 NOTE — PROGRESS NOTE ADULT - ASSESSMENT
Patient is a 73yo M w/ hx of HTN, BPH, kidney stones that presents with  waxing and waning fatigue for approximately 3-4 weeks, loss of appetite and 10-15 unintentional weight loss. Over the past 24 hrs, he developed RLQ abdominal pain, fevers and chills. Labs remarkable for leukocytosis to 11, and CT scan consistent with perforated appendicitis. CT on08/10 Redemonstrated appendicitis with signs of local perforation. The appendix and adjacent fluid are slightly increased in size since 8/5/2022.    Plan:    - LRD diet   - ID recs: d/c on Invanz, got midline  - monitor for fevers  - Pain control prn  - on home meds, consider restarting HCTZ and monitor LE edema  - AM labs          [facility]  [id number]   Patient is a 73yo M w/ hx of HTN, BPH, kidney stones that presents with  waxing and waning fatigue for approximately 3-4 weeks, loss of appetite and 10-15 unintentional weight loss. Over the past 24 hrs, he developed RLQ abdominal pain, fevers and chills. Labs remarkable for leukocytosis to 11, and CT scan consistent with perforated appendicitis. CT on 08/10 Redemonstrated appendicitis with signs of local perforation. The appendix and adjacent fluid are slightly increased in size since 8/5/2022.    Plan:    - LRD diet   - ID recs: d/c on Invanz, got midline  - monitor for fevers  - Pain control prn  - on home meds, consider restarting HCTZ and monitor LE edema  - AM labs          [facility]  [id number]

## 2022-08-14 NOTE — PROVIDER CONTACT NOTE (OTHER) - SITUATION
pt on mossimo  & noted to desat O2 on room air to 87%
pt c/o BL LE swelling new onset since yesterday

## 2022-08-14 NOTE — PROGRESS NOTE ADULT - SUBJECTIVE AND OBJECTIVE BOX
[facility] Progress Note    SUBJECTIVE:   Overnight, patient had b/l pitting LE edema. Legs were elevated overnight to assist in reducing edema, and patient mentioned taking HCTZ at home intermittently  Patient seen and examined at bedside with surgical team.       OBJECTIVE:    Vital Signs Last 24 Hrs  T(C): 36.9 (14 Aug 2022 00:08), Max: 36.9 (13 Aug 2022 13:19)  T(F): 98.4 (14 Aug 2022 00:08), Max: 98.5 (13 Aug 2022 13:19)  HR: 76 (14 Aug 2022 00:08) (73 - 80)  BP: 135/73 (14 Aug 2022 00:08) (135/73 - 148/82)  BP(mean): --  RR: 18 (14 Aug 2022 00:08) (18 - 18)  SpO2: 91% (14 Aug 2022 00:08) (91% - 97%)    Parameters below as of 14 Aug 2022 00:08  Patient On (Oxygen Delivery Method): room air        MEDICATIONS  (STANDING):  acetaminophen     Tablet .. 975 milliGRAM(s) Oral every 6 hours  chlorhexidine 2% Cloths 1 Application(s) Topical <User Schedule>  ertapenem  IVPB      ertapenem  IVPB 1000 milliGRAM(s) IV Intermittent every 24 hours  finasteride 5 milliGRAM(s) Oral daily  heparin   Injectable 5000 Unit(s) SubCutaneous every 8 hours  losartan 100 milliGRAM(s) Oral daily    MEDICATIONS  (PRN):      PHYSICAL EXAM:  Constitutional:  NAD  Respiratory: Unlabored breathing  Abdomen: Soft, nondistended, NTTP. No rebound or guarding.  Extremities: WWP, CALERO spontaneously, b/l LE pitting edema    LABS:                        9.7    12.11 )-----------( 238      ( 13 Aug 2022 05:59 )             29.6     08-13    134<L>  |  100  |  10  ----------------------------<  103<H>  3.8   |  22  |  0.94    Ca    8.0<L>      13 Aug 2022 05:58  Phos  2.2     08-13  Mg     1.9     08-13      PT/INR - ( 12 Aug 2022 07:20 )   PT: 14.3 sec;   INR: 1.24 ratio         PTT - ( 12 Aug 2022 07:20 )  PTT:31.3 sec         [facility] Progress Note    SUBJECTIVE:   Overnight, patient had b/l pitting LE edema. Legs were elevated overnight to assist in reducing edema, and patient mentioned taking HCTZ at home intermittently  Patient seen and examined at bedside with surgical team.       OBJECTIVE:    Vital Signs Last 24 Hrs  T(C): 36.9 (14 Aug 2022 00:08), Max: 36.9 (13 Aug 2022 13:19)  T(F): 98.4 (14 Aug 2022 00:08), Max: 98.5 (13 Aug 2022 13:19)  HR: 76 (14 Aug 2022 00:08) (73 - 80)  BP: 135/73 (14 Aug 2022 00:08) (135/73 - 148/82)  BP(mean): --  RR: 18 (14 Aug 2022 00:08) (18 - 18)  SpO2: 91% (14 Aug 2022 00:08) (91% - 97%)    Parameters below as of 14 Aug 2022 00:08  Patient On (Oxygen Delivery Method): room air        MEDICATIONS  (STANDING):  acetaminophen     Tablet .. 975 milliGRAM(s) Oral every 6 hours  chlorhexidine 2% Cloths 1 Application(s) Topical <User Schedule>  ertapenem  IVPB      ertapenem  IVPB 1000 milliGRAM(s) IV Intermittent every 24 hours  finasteride 5 milliGRAM(s) Oral daily  heparin   Injectable 5000 Unit(s) SubCutaneous every 8 hours  losartan 100 milliGRAM(s) Oral daily    MEDICATIONS  (PRN):      PHYSICAL EXAM:  Constitutional:  NAD  Respiratory: Unlabored breathing  Abdomen: Soft, nondistended, minimal tenderness RLQ. No rebound or guarding.  Extremities: WWP, CALERO spontaneously, b/l LE pitting edema    LABS:                        9.7    12.11 )-----------( 238      ( 13 Aug 2022 05:59 )             29.6     08-13    134<L>  |  100  |  10  ----------------------------<  103<H>  3.8   |  22  |  0.94    Ca    8.0<L>      13 Aug 2022 05:58  Phos  2.2     08-13  Mg     1.9     08-13      PT/INR - ( 12 Aug 2022 07:20 )   PT: 14.3 sec;   INR: 1.24 ratio         PTT - ( 12 Aug 2022 07:20 )  PTT:31.3 sec

## 2022-08-15 VITALS
OXYGEN SATURATION: 96 % | HEART RATE: 82 BPM | SYSTOLIC BLOOD PRESSURE: 142 MMHG | RESPIRATION RATE: 18 BRPM | TEMPERATURE: 99 F | DIASTOLIC BLOOD PRESSURE: 87 MMHG

## 2022-08-15 LAB
ANION GAP SERPL CALC-SCNC: 11 MMOL/L — SIGNIFICANT CHANGE UP (ref 5–17)
BUN SERPL-MCNC: 11 MG/DL — SIGNIFICANT CHANGE UP (ref 7–23)
CALCIUM SERPL-MCNC: 8.7 MG/DL — SIGNIFICANT CHANGE UP (ref 8.4–10.5)
CHLORIDE SERPL-SCNC: 100 MMOL/L — SIGNIFICANT CHANGE UP (ref 96–108)
CO2 SERPL-SCNC: 26 MMOL/L — SIGNIFICANT CHANGE UP (ref 22–31)
CREAT SERPL-MCNC: 1.03 MG/DL — SIGNIFICANT CHANGE UP (ref 0.5–1.3)
EGFR: 78 ML/MIN/1.73M2 — SIGNIFICANT CHANGE UP
GLUCOSE SERPL-MCNC: 94 MG/DL — SIGNIFICANT CHANGE UP (ref 70–99)
HCT VFR BLD CALC: 33 % — LOW (ref 39–50)
HGB BLD-MCNC: 10.5 G/DL — LOW (ref 13–17)
MAGNESIUM SERPL-MCNC: 2 MG/DL — SIGNIFICANT CHANGE UP (ref 1.6–2.6)
MCHC RBC-ENTMCNC: 30.4 PG — SIGNIFICANT CHANGE UP (ref 27–34)
MCHC RBC-ENTMCNC: 31.8 GM/DL — LOW (ref 32–36)
MCV RBC AUTO: 95.7 FL — SIGNIFICANT CHANGE UP (ref 80–100)
NRBC # BLD: 0 /100 WBCS — SIGNIFICANT CHANGE UP (ref 0–0)
PHOSPHATE SERPL-MCNC: 3.1 MG/DL — SIGNIFICANT CHANGE UP (ref 2.5–4.5)
PLATELET # BLD AUTO: 292 K/UL — SIGNIFICANT CHANGE UP (ref 150–400)
POTASSIUM SERPL-MCNC: 4.1 MMOL/L — SIGNIFICANT CHANGE UP (ref 3.5–5.3)
POTASSIUM SERPL-SCNC: 4.1 MMOL/L — SIGNIFICANT CHANGE UP (ref 3.5–5.3)
RBC # BLD: 3.45 M/UL — LOW (ref 4.2–5.8)
RBC # FLD: 14 % — SIGNIFICANT CHANGE UP (ref 10.3–14.5)
SARS-COV-2 RNA SPEC QL NAA+PROBE: SIGNIFICANT CHANGE UP
SODIUM SERPL-SCNC: 137 MMOL/L — SIGNIFICANT CHANGE UP (ref 135–145)
WBC # BLD: 12.39 K/UL — HIGH (ref 3.8–10.5)
WBC # FLD AUTO: 12.39 K/UL — HIGH (ref 3.8–10.5)

## 2022-08-15 PROCEDURE — 85018 HEMOGLOBIN: CPT

## 2022-08-15 PROCEDURE — 81001 URINALYSIS AUTO W/SCOPE: CPT

## 2022-08-15 PROCEDURE — 87086 URINE CULTURE/COLONY COUNT: CPT

## 2022-08-15 PROCEDURE — 82435 ASSAY OF BLOOD CHLORIDE: CPT

## 2022-08-15 PROCEDURE — 86900 BLOOD TYPING SEROLOGIC ABO: CPT

## 2022-08-15 PROCEDURE — 85014 HEMATOCRIT: CPT

## 2022-08-15 PROCEDURE — C1751: CPT

## 2022-08-15 PROCEDURE — 85027 COMPLETE CBC AUTOMATED: CPT

## 2022-08-15 PROCEDURE — 71045 X-RAY EXAM CHEST 1 VIEW: CPT

## 2022-08-15 PROCEDURE — 85610 PROTHROMBIN TIME: CPT

## 2022-08-15 PROCEDURE — 96375 TX/PRO/DX INJ NEW DRUG ADDON: CPT

## 2022-08-15 PROCEDURE — 82803 BLOOD GASES ANY COMBINATION: CPT

## 2022-08-15 PROCEDURE — 86803 HEPATITIS C AB TEST: CPT

## 2022-08-15 PROCEDURE — 82330 ASSAY OF CALCIUM: CPT

## 2022-08-15 PROCEDURE — 86850 RBC ANTIBODY SCREEN: CPT

## 2022-08-15 PROCEDURE — 82947 ASSAY GLUCOSE BLOOD QUANT: CPT

## 2022-08-15 PROCEDURE — 80048 BASIC METABOLIC PNL TOTAL CA: CPT

## 2022-08-15 PROCEDURE — 83605 ASSAY OF LACTIC ACID: CPT

## 2022-08-15 PROCEDURE — 85025 COMPLETE CBC W/AUTO DIFF WBC: CPT

## 2022-08-15 PROCEDURE — 80053 COMPREHEN METABOLIC PANEL: CPT

## 2022-08-15 PROCEDURE — 84295 ASSAY OF SERUM SODIUM: CPT

## 2022-08-15 PROCEDURE — U0005: CPT

## 2022-08-15 PROCEDURE — 83735 ASSAY OF MAGNESIUM: CPT

## 2022-08-15 PROCEDURE — 36569 INSJ PICC 5 YR+ W/O IMAGING: CPT

## 2022-08-15 PROCEDURE — 99285 EMERGENCY DEPT VISIT HI MDM: CPT

## 2022-08-15 PROCEDURE — 36415 COLL VENOUS BLD VENIPUNCTURE: CPT

## 2022-08-15 PROCEDURE — 74177 CT ABD & PELVIS W/CONTRAST: CPT | Mod: MA

## 2022-08-15 PROCEDURE — 86901 BLOOD TYPING SEROLOGIC RH(D): CPT

## 2022-08-15 PROCEDURE — 96374 THER/PROPH/DIAG INJ IV PUSH: CPT

## 2022-08-15 PROCEDURE — 87040 BLOOD CULTURE FOR BACTERIA: CPT

## 2022-08-15 PROCEDURE — U0003: CPT

## 2022-08-15 PROCEDURE — 85730 THROMBOPLASTIN TIME PARTIAL: CPT

## 2022-08-15 PROCEDURE — 84100 ASSAY OF PHOSPHORUS: CPT

## 2022-08-15 PROCEDURE — 84132 ASSAY OF SERUM POTASSIUM: CPT

## 2022-08-15 RX ADMIN — FINASTERIDE 5 MILLIGRAM(S): 5 TABLET, FILM COATED ORAL at 12:36

## 2022-08-15 RX ADMIN — Medication 975 MILLIGRAM(S): at 12:36

## 2022-08-15 RX ADMIN — CHLORHEXIDINE GLUCONATE 1 APPLICATION(S): 213 SOLUTION TOPICAL at 12:38

## 2022-08-15 RX ADMIN — Medication 975 MILLIGRAM(S): at 06:21

## 2022-08-15 RX ADMIN — Medication 975 MILLIGRAM(S): at 06:51

## 2022-08-15 RX ADMIN — Medication 975 MILLIGRAM(S): at 01:27

## 2022-08-15 RX ADMIN — HEPARIN SODIUM 5000 UNIT(S): 5000 INJECTION INTRAVENOUS; SUBCUTANEOUS at 13:59

## 2022-08-15 RX ADMIN — LOSARTAN POTASSIUM 100 MILLIGRAM(S): 100 TABLET, FILM COATED ORAL at 06:23

## 2022-08-15 RX ADMIN — HEPARIN SODIUM 5000 UNIT(S): 5000 INJECTION INTRAVENOUS; SUBCUTANEOUS at 06:31

## 2022-08-15 RX ADMIN — ERTAPENEM SODIUM 120 MILLIGRAM(S): 1 INJECTION, POWDER, LYOPHILIZED, FOR SOLUTION INTRAMUSCULAR; INTRAVENOUS at 12:35

## 2022-08-15 RX ADMIN — Medication 975 MILLIGRAM(S): at 00:57

## 2022-08-15 NOTE — PROGRESS NOTE ADULT - SUBJECTIVE AND OBJECTIVE BOX
Subjective: Patient seen and examined. No new events except as noted.   Feels good.    REVIEW OF SYSTEMS:    CONSTITUTIONAL: + weakness, fevers or chills  EYES/ENT: No visual changes;  No vertigo or throat pain   NECK: No pain or stiffness  RESPIRATORY: No cough, wheezing, hemoptysis; No shortness of breath  CARDIOVASCULAR: No chest pain or palpitations  GASTROINTESTINAL: No abdominal or epigastric pain. No nausea, vomiting, or hematemesis; No diarrhea or constipation. No melena or hematochezia.  GENITOURINARY: No dysuria, frequency or hematuria  NEUROLOGICAL: No numbness or weakness  SKIN: No itching, burning, rashes, or lesions   All other review of systems is negative unless indicated above.    MEDICATIONS:  MEDICATIONS  (STANDING):  acetaminophen     Tablet .. 975 milliGRAM(s) Oral every 6 hours  chlorhexidine 2% Cloths 1 Application(s) Topical <User Schedule>  ertapenem  IVPB      ertapenem  IVPB 1000 milliGRAM(s) IV Intermittent every 24 hours  finasteride 5 milliGRAM(s) Oral daily  heparin   Injectable 5000 Unit(s) SubCutaneous every 8 hours  losartan 100 milliGRAM(s) Oral daily    PHYSICAL EXAM:  T(C): 36.8 (08-15-22 @ 00:58), Max: 37.8 (08-14-22 @ 20:34)  HR: 80 (08-15-22 @ 00:58) (74 - 85)  BP: 141/88 (08-15-22 @ 00:58) (124/75 - 141/88)  RR: 18 (08-15-22 @ 00:58) (18 - 18)  SpO2: 96% (08-15-22 @ 00:58) (94% - 96%)  Wt(kg): --  I&O's Summary    14 Aug 2022 07:01  -  15 Aug 2022 07:00  --------------------------------------------------------  IN: 310 mL / OUT: 0 mL / NET: 310 mL    Appearance: Normal	  HEENT:   Normal oral mucosa, PERRL, EOMI	  Lymphatic: No lymphadenopathy , no edema  Cardiovascular: Normal S1 S2, No JVD, No murmurs , Peripheral pulses palpable 2+ bilaterally  Respiratory: Lungs clear to auscultation, normal effort 	  Gastrointestinal:  Soft, Non-tender, + BS	  Skin: No rashes, No ecchymoses, No cyanosis, warm to touch  Musculoskeletal: Normal range of motion, normal strength  Psychiatry:  Mood & affect appropriate  Ext: No edema    LABS:    CARDIAC MARKERS:                       11.1   11.42 )-----------( 257      ( 14 Aug 2022 07:10 )             34.7     08-14    138  |  102  |  11  ----------------------------<  92  4.0   |  24  |  1.03    Ca    8.5      14 Aug 2022 07:12  Phos  3.1     08-14  Mg     2.3     08-14    proBNP:   Lipid Profile:   HgA1c:   TSH:     TELEMETRY: 	    ECG:  	  RADIOLOGY:   DIAGNOSTIC TESTING:  [ ] Echocardiogram:  [ ]  Catheterization:  [ ] Stress Test:    OTHER:

## 2022-08-15 NOTE — PROGRESS NOTE ADULT - SUBJECTIVE AND OBJECTIVE BOX
SURGERY DAILY PROGRESS NOTE:       SUBJECTIVE/ROS:   No overnight events.     Patient seen and evaluated on AM rounds.          OBJECTIVE:    Vital Signs Last 24 Hrs  T(C): 36.8 (15 Aug 2022 00:58), Max: 37.8 (14 Aug 2022 20:34)  T(F): 98.3 (15 Aug 2022 00:58), Max: 100 (14 Aug 2022 20:34)  HR: 80 (15 Aug 2022 00:58) (74 - 88)  BP: 141/88 (15 Aug 2022 00:58) (124/75 - 152/95)  BP(mean): --  RR: 18 (15 Aug 2022 00:58) (18 - 19)  SpO2: 96% (15 Aug 2022 00:58) (90% - 97%)    Parameters below as of 15 Aug 2022 00:58  Patient On (Oxygen Delivery Method): room air      I&O's Detail    13 Aug 2022 07:01  -  14 Aug 2022 07:00  --------------------------------------------------------  IN:    IV PiggyBack: 50 mL    IV PiggyBack: 100 mL    Oral Fluid: 880 mL  Total IN: 1030 mL    OUT:  Total OUT: 0 mL    Total NET: 1030 mL      14 Aug 2022 07:01  -  15 Aug 2022 03:53  --------------------------------------------------------  IN:    IV PiggyBack: 50 mL    Oral Fluid: 260 mL  Total IN: 310 mL    OUT:  Total OUT: 0 mL    Total NET: 310 mL        Daily     Daily   MEDICATIONS  (STANDING):  acetaminophen     Tablet .. 975 milliGRAM(s) Oral every 6 hours  chlorhexidine 2% Cloths 1 Application(s) Topical <User Schedule>  ertapenem  IVPB 1000 milliGRAM(s) IV Intermittent every 24 hours  ertapenem  IVPB      finasteride 5 milliGRAM(s) Oral daily  heparin   Injectable 5000 Unit(s) SubCutaneous every 8 hours  losartan 100 milliGRAM(s) Oral daily    MEDICATIONS  (PRN):      LABS:                        11.1   11.42 )-----------( 257      ( 14 Aug 2022 07:10 )             34.7     08-14    138  |  102  |  11  ----------------------------<  92  4.0   |  24  |  1.03    Ca    8.5      14 Aug 2022 07:12  Phos  3.1     08-14  Mg     2.3     08-14                    PHYSICAL EXAM:  Constitutional: well developed, well nourished, NAD  ENMT: normal facies, symmetric  Respiratory: CTA bilaterally  Cardiovascular: RRR  Gastrointestinal:   Extremities: FROM, warm  Neurological: intact, non-focal  Psychiatric: oriented x 3; appropriate         SURGERY DAILY PROGRESS NOTE:       SUBJECTIVE/ROS:   No overnight events.     Patient seen and evaluated on AM rounds. Denies pain, nausea or vomiting. Passing gas and having BM         OBJECTIVE:    Vital Signs Last 24 Hrs  T(C): 36.8 (15 Aug 2022 00:58), Max: 37.8 (14 Aug 2022 20:34)  T(F): 98.3 (15 Aug 2022 00:58), Max: 100 (14 Aug 2022 20:34)  HR: 80 (15 Aug 2022 00:58) (74 - 88)  BP: 141/88 (15 Aug 2022 00:58) (124/75 - 152/95)  BP(mean): --  RR: 18 (15 Aug 2022 00:58) (18 - 19)  SpO2: 96% (15 Aug 2022 00:58) (90% - 97%)    Parameters below as of 15 Aug 2022 00:58  Patient On (Oxygen Delivery Method): room air      I&O's Detail    13 Aug 2022 07:01  -  14 Aug 2022 07:00  --------------------------------------------------------  IN:    IV PiggyBack: 50 mL    IV PiggyBack: 100 mL    Oral Fluid: 880 mL  Total IN: 1030 mL    OUT:  Total OUT: 0 mL    Total NET: 1030 mL      14 Aug 2022 07:01  -  15 Aug 2022 03:53  --------------------------------------------------------  IN:    IV PiggyBack: 50 mL    Oral Fluid: 260 mL  Total IN: 310 mL    OUT:  Total OUT: 0 mL    Total NET: 310 mL        Daily     Daily   MEDICATIONS  (STANDING):  acetaminophen     Tablet .. 975 milliGRAM(s) Oral every 6 hours  chlorhexidine 2% Cloths 1 Application(s) Topical <User Schedule>  ertapenem  IVPB 1000 milliGRAM(s) IV Intermittent every 24 hours  ertapenem  IVPB      finasteride 5 milliGRAM(s) Oral daily  heparin   Injectable 5000 Unit(s) SubCutaneous every 8 hours  losartan 100 milliGRAM(s) Oral daily    MEDICATIONS  (PRN):      LABS:                        11.1   11.42 )-----------( 257      ( 14 Aug 2022 07:10 )             34.7     08-14    138  |  102  |  11  ----------------------------<  92  4.0   |  24  |  1.03    Ca    8.5      14 Aug 2022 07:12  Phos  3.1     08-14  Mg     2.3     08-14                    PHYSICAL EXAM:  Constitutional: well developed, well nourished, NAD  ENMT: normal facies, symmetric  Respiratory: CTA bilaterally  Cardiovascular: RRR  Gastrointestinal:   Extremities: FROM, warm  Neurological: intact, non-focal  Psychiatric: oriented x 3; appropriate         SURGERY DAILY PROGRESS NOTE:       SUBJECTIVE/ROS:   No overnight events.     Patient seen and evaluated on AM rounds. Denies pain, nausea or vomiting. Passing gas and having BM         OBJECTIVE:    Vital Signs Last 24 Hrs  T(C): 36.8 (15 Aug 2022 00:58), Max: 37.8 (14 Aug 2022 20:34)  T(F): 98.3 (15 Aug 2022 00:58), Max: 100 (14 Aug 2022 20:34)  HR: 80 (15 Aug 2022 00:58) (74 - 88)  BP: 141/88 (15 Aug 2022 00:58) (124/75 - 152/95)  BP(mean): --  RR: 18 (15 Aug 2022 00:58) (18 - 19)  SpO2: 96% (15 Aug 2022 00:58) (90% - 97%)    Parameters below as of 15 Aug 2022 00:58  Patient On (Oxygen Delivery Method): room air      I&O's Detail    13 Aug 2022 07:01  -  14 Aug 2022 07:00  --------------------------------------------------------  IN:    IV PiggyBack: 50 mL    IV PiggyBack: 100 mL    Oral Fluid: 880 mL  Total IN: 1030 mL    OUT:  Total OUT: 0 mL    Total NET: 1030 mL      14 Aug 2022 07:01  -  15 Aug 2022 03:53  --------------------------------------------------------  IN:    IV PiggyBack: 50 mL    Oral Fluid: 260 mL  Total IN: 310 mL    OUT:  Total OUT: 0 mL    Total NET: 310 mL        Daily     Daily   MEDICATIONS  (STANDING):  acetaminophen     Tablet .. 975 milliGRAM(s) Oral every 6 hours  chlorhexidine 2% Cloths 1 Application(s) Topical <User Schedule>  ertapenem  IVPB 1000 milliGRAM(s) IV Intermittent every 24 hours  ertapenem  IVPB      finasteride 5 milliGRAM(s) Oral daily  heparin   Injectable 5000 Unit(s) SubCutaneous every 8 hours  losartan 100 milliGRAM(s) Oral daily    MEDICATIONS  (PRN):      LABS:                        11.1   11.42 )-----------( 257      ( 14 Aug 2022 07:10 )             34.7     08-14    138  |  102  |  11  ----------------------------<  92  4.0   |  24  |  1.03    Ca    8.5      14 Aug 2022 07:12  Phos  3.1     08-14  Mg     2.3     08-14                    PHYSICAL EXAM:  Constitutional: well developed, well nourished, NAD  ENMT: normal facies, symmetric  Respiratory: CTA bilaterally  Cardiovascular: RRR  Gastrointestinal: Abd soft, non distended, mild tenderness to palpation in RLQ, non peritoneal.   Extremities: FROM, warm  Neurological: intact, non-focal  Psychiatric: oriented x 3; appropriate

## 2022-08-15 NOTE — PROGRESS NOTE ADULT - ATTENDING COMMENTS
date of service 8/13/22    pt seen and examined  pt chart and imaging reviewed in detail  agree with note above  72M pmhx of HTN, BPH, kidney stones that presents with  waxing and waning fatigue for approximately 3-4 weeks, loss of appetite and 10-15 unintentional weight loss. Over the past 24 hrs, he developed RLQ abdominal pain, fevers and chills. Labs remarkable for leukocytosis to 11, and CT scan consistent with perforated appendicitis with phlegmonous changes no drainable collection.   no acute surgical intervention at this time for likely chronic perforated appendicitis.  tolerating LRD  afebrile  no n/v  + flatus/BM with some blood, reports hx of hemorrhoids  soft, min RLQ tenderness, no r/g  cont LRD  cont iv abx per ID   appreciate ID consult re abx course  serial abd exams  f/u labs in am  wbc 12k today   rpt CT 8/10 no drainable abscess/collection - d/w ID and pt in detail will observe on iv abx for 48hrs and reassess whether stable to dc home or intervene surgical prior to discharge.   dc planning for home with VNS for iv abx. unable to set up for yesterday - tentatively set for dc monday 8/15.   continue nonoperative rx for now, will likely need interval imaging ~ 2-4 weeks and benefit from interval appendectomy tentatively 6-8 weeks pending his clinical course, if fails to improve may require possible intervention/exploration sooner.   d/w pt @ bedside in detail.
date of service 8/7/22    pt seen and examined  pt chart and imaging reviewed in detail  agree with note above  72M pmhx of HTN, BPH, kidney stones that presents with  waxing and waning fatigue for approximately 3-4 weeks, loss of appetite and 10-15 unintentional weight loss. Over the past 24 hrs, he developed RLQ abdominal pain, fevers and chills. Labs remarkable for leukocytosis to 11, and CT scan consistent with perforated appendicitis with phlegmonous changes no drainable collection.   no acute surgical intervention at this time for likely chronic perforated appendicitis.  tolerating CLD  no n/v  + flatus/BM  soft, min RLQ tenderness, no r/g  adv to LRD  cont ivf, iv abx  serial abd exams, monitor fever curve  f/u labs in am  continue nonoperative rx for now, will likely need interval imaging ~ 2-4 weeks and benefit from interval appendectomy tentatively 6-8 weeks pending his clinical course, if fails to improve may require rpt imaging r/o collection/abscess and possible intervention/exploration sooner.   d/w pt @ bedside in detail.
date of service 8/10/22    pt seen and examined  pt chart and imaging reviewed in detail  agree with note above  72M pmhx of HTN, BPH, kidney stones that presents with  waxing and waning fatigue for approximately 3-4 weeks, loss of appetite and 10-15 unintentional weight loss. Over the past 24 hrs, he developed RLQ abdominal pain, fevers and chills. Labs remarkable for leukocytosis to 11, and CT scan consistent with perforated appendicitis with phlegmonous changes no drainable collection.   no acute surgical intervention at this time for likely chronic perforated appendicitis.  tolerating LRD  afebrile  no n/v  + flatus/BM loose  soft, min RLQ tenderness, no r/g  cont LRD  cont ivf, iv abx per ID   f/u ID consult re abx course  serial abd exams, monitor fever curve  f/u labs in am  rpt CT today no drainable abscess/collection - d/w ID and pt in detail will observe on iv abx for 48hrs and reassess whether stable to dc home or intervene surgical prior to discharge.   continue nonoperative rx for now, will likely need interval imaging ~ 2-4 weeks and benefit from interval appendectomy tentatively 6-8 weeks pending his clinical course, if fails to improve may require possible intervention/exploration sooner.   d/w pt @ bedside in detail.
date of service 8/11/22    pt seen and examined  pt chart and imaging reviewed in detail  agree with note above  72M pmhx of HTN, BPH, kidney stones that presents with  waxing and waning fatigue for approximately 3-4 weeks, loss of appetite and 10-15 unintentional weight loss. Over the past 24 hrs, he developed RLQ abdominal pain, fevers and chills. Labs remarkable for leukocytosis to 11, and CT scan consistent with perforated appendicitis with phlegmonous changes no drainable collection.   no acute surgical intervention at this time for likely chronic perforated appendicitis.  tolerating LRD  afebrile  no n/v  + flatus/BM loose  soft, min RLQ tenderness, no r/g  cont LRD  cont ivf, iv abx per ID   appreciate ID consult re abx course  serial abd exams, monitor fever curve  f/u labs in am  rpt CT 8/10 no drainable abscess/collection - d/w ID and pt in detail will observe on iv abx for 48hrs and reassess whether stable to dc home or intervene surgical prior to discharge.   continue nonoperative rx for now, will likely need interval imaging ~ 2-4 weeks and benefit from interval appendectomy tentatively 6-8 weeks pending his clinical course, if fails to improve may require possible intervention/exploration sooner.   d/w pt @ bedside in detail.
date of service 8/12/22    pt seen and examined  pt chart and imaging reviewed in detail  agree with note above  72M pmhx of HTN, BPH, kidney stones that presents with  waxing and waning fatigue for approximately 3-4 weeks, loss of appetite and 10-15 unintentional weight loss. Over the past 24 hrs, he developed RLQ abdominal pain, fevers and chills. Labs remarkable for leukocytosis to 11, and CT scan consistent with perforated appendicitis with phlegmonous changes no drainable collection.   no acute surgical intervention at this time for likely chronic perforated appendicitis.  tolerating LRD  afebrile  no n/v  + flatus/BM loose  soft, min RLQ tenderness, no r/g  cont LRD  cont ivf, iv abx per ID   appreciate ID consult re abx course  serial abd exams, monitor fever curve  f/u labs in am  rpt CT 8/10 no drainable abscess/collection - d/w ID and pt in detail will observe on iv abx for 48hrs and reassess whether stable to dc home or intervene surgical prior to discharge.   dc planning for home with VNS for iv abx.   continue nonoperative rx for now, will likely need interval imaging ~ 2-4 weeks and benefit from interval appendectomy tentatively 6-8 weeks pending his clinical course, if fails to improve may require possible intervention/exploration sooner.   d/w pt @ bedside in detail.
date of service 8/14/22    pt seen and examined  pt chart and imaging reviewed in detail  agree with note above  72M pmhx of HTN, BPH, kidney stones that presents with  waxing and waning fatigue for approximately 3-4 weeks, loss of appetite and 10-15 unintentional weight loss. Over the past 24 hrs, he developed RLQ abdominal pain, fevers and chills. Labs remarkable for leukocytosis to 11, and CT scan consistent with perforated appendicitis with phlegmonous changes no drainable collection.   no acute surgical intervention at this time for likely chronic perforated appendicitis.  tolerating LRD  afebrile  no n/v  + flatus/BM with some blood, reports hx of hemorrhoids  soft, min RLQ tenderness, no r/g  cont LRD  cont iv abx per ID   appreciate ID consult re abx course  serial abd exams  f/u labs in am  wbc 12k today   rpt CT 8/10 no drainable abscess/collection - d/w ID and pt in detail will observe on iv abx for 48hrs and reassess whether stable to dc home or intervene surgical prior to discharge.   dc planning for home with VNS for iv abx. unable to set up for yesterday - tentatively set for dc monday 8/15.   continue nonoperative rx for now, will likely need interval imaging ~ 2-4 weeks and benefit from interval appendectomy tentatively 6-8 weeks pending his clinical course, if fails to improve may require possible intervention/exploration sooner.   d/w pt @ bedside in detail.
date of service 8/6/22    pt seen and examined  pt chart and imaging reviewed in detail  agree with note above  72M pmhx of HTN, BPH, kidney stones that presents with  waxing and waning fatigue for approximately 3-4 weeks, loss of appetite and 10-15 unintentional weight loss. Over the past 24 hrs, he developed RLQ abdominal pain, fevers and chills. Labs remarkable for leukocytosis to 11, and CT scan consistent with perforated appendicitis with phlegmonous changes no drainable collection.   no acute surgical intervention at this time for likely chronic perforated appendicitis.  cont npo, ivf, iv abx  serial abd exams, monitor fever curve  f/u labs in am  await return of gi fxn.   continue nonoperative rx for now, will likely need interval imaging ~ 2-4 weeks and benefit from interval appendectomy tentatively 6-8 weeks pending his clinical course, if fails to improve may require rpt imaging r/o collection/abscess and possible intervention/exploration sooner.   d/w pt @ bedside in detail.
date of service 8/8/22    pt seen and examined  pt chart and imaging reviewed in detail  agree with note above  72M pmhx of HTN, BPH, kidney stones that presents with  waxing and waning fatigue for approximately 3-4 weeks, loss of appetite and 10-15 unintentional weight loss. Over the past 24 hrs, he developed RLQ abdominal pain, fevers and chills. Labs remarkable for leukocytosis to 11, and CT scan consistent with perforated appendicitis with phlegmonous changes no drainable collection.   no acute surgical intervention at this time for likely chronic perforated appendicitis.  tolerating LRD  had temp 100.5 overnight.  no n/v  + flatus/BM loose  soft, mild RLQ tenderness, no r/g  cont LRD  cont ivf, iv abx  f/u ID consult re abx course  serial abd exams, monitor fever curve  f/u labs in am  continue nonoperative rx for now, will likely need interval imaging ~ 2-4 weeks and benefit from interval appendectomy tentatively 6-8 weeks pending his clinical course, if fails to improve may require rpt imaging r/o collection/abscess and possible intervention/exploration sooner.   d/w pt & wife @ bedside in detail.
date of service 8/9/22    pt seen and examined  pt chart and imaging reviewed in detail  agree with note above  72M pmhx of HTN, BPH, kidney stones that presents with  waxing and waning fatigue for approximately 3-4 weeks, loss of appetite and 10-15 unintentional weight loss. Over the past 24 hrs, he developed RLQ abdominal pain, fevers and chills. Labs remarkable for leukocytosis to 11, and CT scan consistent with perforated appendicitis with phlegmonous changes no drainable collection.   no acute surgical intervention at this time for likely chronic perforated appendicitis.  tolerating LRD  afebrile  no n/v  + flatus/BM loose  soft, mild RLQ tenderness, no r/g  cont LRD  cont ivf, iv abx per ID   f/u ID consult re abx course  serial abd exams, monitor fever curve  f/u labs in am  pt/family requesting rpt CT sooner - plan for CT in am r/o abscess/collection   continue nonoperative rx for now, will likely need interval imaging ~ 2-4 weeks and benefit from interval appendectomy tentatively 6-8 weeks pending his clinical course, if fails to improve may require rpt imaging r/o collection/abscess and possible intervention/exploration sooner.   d/w pt & wife @ bedside in detail.
date of service 8/15/22    pt seen and examined  pt chart and imaging reviewed in detail  agree with note above  72M pmhx of HTN, BPH, kidney stones that presents with  waxing and waning fatigue for approximately 3-4 weeks, loss of appetite and 10-15 unintentional weight loss. Over the past 24 hrs, he developed RLQ abdominal pain, fevers and chills. Labs remarkable for leukocytosis to 11, and CT scan consistent with perforated appendicitis with phlegmonous changes no drainable collection.   no acute surgical intervention at this time for likely chronic perforated appendicitis.  tolerating LRD  afebrile  no n/v  + flatus/BM with some blood, reports hx of hemorrhoids  soft, min RLQ tenderness, no r/g  cont LRD  cont iv abx per ID   appreciate ID consult re abx course  serial abd exams  f/u labs in am  wbc 12k today   rpt CT 8/10 no drainable abscess/collection - d/w ID and pt in detail will observe on iv abx for 48hrs and reassess whether stable to dc home or intervene surgical prior to discharge.   dc planning for home with VNS for iv abx. unable to set up for friday - tentatively set for dc today monday 8/15.   continue nonoperative rx for now, will likely need interval imaging ~ 2-4 weeks and benefit from interval appendectomy tentatively 6-8 weeks pending his clinical course, if fails to improve may require possible intervention/exploration sooner.   d/w pt @ bedside in detail.

## 2022-08-15 NOTE — PROGRESS NOTE ADULT - PROBLEM SELECTOR PLAN 1
Longterm IV abx - Invanz  Plan for surgery at later date    - Would be acceptable cardiac risk to proceed with surgery.  pain management  surgery following
Long term IV abx - Invanz  Plan for surgery at later date    - Would be acceptable cardiac risk to proceed with surgery.  pain management  surgery following
IV abx   Plan for surgery at later date  Would be Acceptable cardiac risk to proceed with surgery  Surgery follow up.
Long term IV abx - Invanz  pain management  surgery following

## 2022-08-15 NOTE — PROGRESS NOTE ADULT - SUBJECTIVE AND OBJECTIVE BOX
Name of Patient : BREN HARVEY  MRN: [id number]  Date of visit: 08-15-22 @ 15:14      Subjective: Patient seen and examined. No new events except as noted.   Doing okay   D/ C planing on iv antibiotics     REVIEW OF SYSTEMS:    CONSTITUTIONAL: No weakness, fevers or chills  EYES/ENT: No visual changes;  No vertigo or throat pain   NECK: No pain or stiffness  RESPIRATORY: No cough, wheezing, hemoptysis; No shortness of breath  CARDIOVASCULAR: No chest pain or palpitations  GASTROINTESTINAL: No abdominal or epigastric pain. No nausea, vomiting, or hematemesis; No diarrhea or constipation. No melena or hematochezia.  GENITOURINARY: No dysuria, frequency or hematuria  NEUROLOGICAL: No numbness or weakness  SKIN: No itching, burning, rashes, or lesions   All other review of systems is negative unless indicated above.    MEDICATIONS:  MEDICATIONS  (STANDING):  acetaminophen     Tablet .. 975 milliGRAM(s) Oral every 6 hours  chlorhexidine 2% Cloths 1 Application(s) Topical <User Schedule>  finasteride 5 milliGRAM(s) Oral daily  heparin   Injectable 5000 Unit(s) SubCutaneous every 8 hours  losartan 100 milliGRAM(s) Oral daily      PHYSICAL EXAM:  T(C): 37.2 (08-15-22 @ 12:33), Max: 37.8 (08-14-22 @ 20:34)  HR: 82 (08-15-22 @ 12:33) (77 - 85)  BP: 142/87 (08-15-22 @ 12:33) (114/79 - 142/87)  RR: 18 (08-15-22 @ 12:33) (18 - 18)  SpO2: 96% (08-15-22 @ 12:33) (95% - 96%)  Wt(kg): --  I&O's Summary    14 Aug 2022 07:01  -  15 Aug 2022 07:00  --------------------------------------------------------  IN: 310 mL / OUT: 0 mL / NET: 310 mL    15 Aug 2022 07:01  -  15 Aug 2022 15:14  --------------------------------------------------------  IN: 350 mL / OUT: 0 mL / NET: 350 mL          Appearance: Normal	  HEENT:  PERRLA   Lymphatic: No lymphadenopathy   Cardiovascular: Normal S1 S2, no JVD  Respiratory: normal effort , clear  Gastrointestinal:  Soft, Non-tender  Skin: No rashes,  warm to touch  Psychiatry:  Mood & affect appropriate  Musculuskeletal: No edema      All labs, Imaging and EKGs personally reviewed       08-14-22 @ 07:01  -  08-15-22 @ 07:00  --------------------------------------------------------  IN: 310 mL / OUT: 0 mL / NET: 310 mL    08-15-22 @ 07:01  -  08-15-22 @ 15:14  --------------------------------------------------------  IN: 350 mL / OUT: 0 mL / NET: 350 mL                          10.5   12.39 )-----------( 292      ( 15 Aug 2022 07:19 )             33.0               08-15    137  |  100  |  11  ----------------------------<  94  4.1   |  26  |  1.03    Ca    8.7      15 Aug 2022 07:20  Phos  3.1     08-15  Mg     2.0     08-15

## 2022-08-15 NOTE — PROGRESS NOTE ADULT - ASSESSMENT
Patient is a 73 Y/O Male with Phx of HTN, BPH, kidney stones that presents with  waxing and waning fatigue for approximately 3-4 weeks, loss of appetite and 10-15 unintentional weight loss. Over the past 24 hrs, he developed RLQ abdominal pain, fevers and chills. Labs remarkable for leukocytosis to 11, and CT scan consistent with perforated appendicitis.  No chest pain, shortness of breath or palpitations.     # Perforated appendicitis   surg care appreciated  on long term antibiotics, Picc placed, pt education   ID evla appreciated  monitor electrolytes, pain control   diet as tolerated       # HTN   monitor vitals   home meds if needed ( losartan and HTTZ)     # BPH finasteride  monitor output       Discussed in detail with patient  D/C planing

## 2022-08-15 NOTE — PROGRESS NOTE ADULT - PROBLEM SELECTOR PLAN 2
continue with meds as ordered  continue to monitor
Stable   cont with meds.
continue with meds as ordered  continue to monitor
continue with meds as ordered  continue to monitor

## 2022-08-15 NOTE — PROGRESS NOTE ADULT - ASSESSMENT
Patient is a 73yo M w/ hx of HTN, BPH, kidney stones that presents with  waxing and waning fatigue for approximately 3-4 weeks, loss of appetite and 10-15 unintentional weight loss. Over the past 24 hrs, he developed RLQ abdominal pain, fevers and chills. Labs remarkable for leukocytosis to 11, and CT scan consistent with perforated appendicitis. CT on 08/10 Redemonstrated appendicitis with signs of local perforation. The appendix and adjacent fluid are slightly increased in size since 8/5/2022.    Plan:    - LRD diet   - ID recs: d/c on Invanz, has midline  - monitor for fevers, abd pain  - Pain control prn  - on home meds, HCTZ restarted  - AM labs          [facility] Surgery  [id number]   Patient is a 71yo M w/ hx of HTN, BPH, kidney stones that presents with  waxing and waning fatigue for approximately 3-4 weeks, loss of appetite and 10-15 unintentional weight loss. Over the past 24 hrs, he developed RLQ abdominal pain, fevers and chills. Labs remarkable for leukocytosis to 11, and CT scan consistent with perforated appendicitis. CT on 08/10 Redemonstrated appendicitis with signs of local perforation. The appendix and adjacent fluid are slightly increased in size since 8/5/2022.    Plan:    - LRD diet   - ID recs: d/c on Invanz, has midline  - monitor for fevers, abd pain  - Pain control prn  - on home meds, HCTZ restarted  - AM labs  - COVID test today,           Red Surgery  [id number]   Patient is a 71yo M w/ hx of HTN, BPH, kidney stones that presents with  waxing and waning fatigue for approximately 3-4 weeks, loss of appetite and 10-15 unintentional weight loss. Over the past 24 hrs, he developed RLQ abdominal pain, fevers and chills. Labs remarkable for leukocytosis to 11, and CT scan consistent with perforated appendicitis. CT on 08/10 Redemonstrated appendicitis with signs of local perforation. The appendix and adjacent fluid are slightly increased in size since 8/5/2022.    Plan:  - LRD diet   - ID recs: d/c on Invanz, has midline  - monitor for fevers, abd pain  - Pain control prn  - on home meds, HCTZ restarted  - AM labs, F/U WBC  - COVID test today,           [facility]  [id number]   Patient is a 73yo M w/ hx of HTN, BPH, kidney stones that presents with  waxing and waning fatigue for approximately 3-4 weeks, loss of appetite and 10-15 unintentional weight loss. Over the past 24 hrs, he developed RLQ abdominal pain, fevers and chills. Labs remarkable for leukocytosis to 11, and CT scan consistent with perforated appendicitis. CT on 08/10 Redemonstrated appendicitis with signs of local perforation. The appendix and adjacent fluid are slightly increased in size since 8/5/2022.    Plan:  - LRD diet   - ID recs: d/c on Invanz, has midline  - monitor for fevers, abd pain  - Pain control prn  - on home meds, HCTZ restarted  - AM labs, F/U WBC  - COVID test today.  - D/C home          Red Surgery  [id number]

## 2022-08-15 NOTE — PROGRESS NOTE ADULT - REASON FOR ADMISSION
abdominal pain

## 2022-08-15 NOTE — PROGRESS NOTE ADULT - PROVIDER SPECIALTY LIST ADULT
Infectious Disease
Infectious Disease
Internal Medicine
Surgery
Surgery
Infectious Disease
Internal Medicine
Internal Medicine
Surgery
Cardiology
Internal Medicine
Surgery
Cardiology

## 2022-08-19 ENCOUNTER — NON-APPOINTMENT (OUTPATIENT)
Age: 72
End: 2022-08-19

## 2022-08-19 PROBLEM — N40.0 BENIGN PROSTATIC HYPERPLASIA WITHOUT LOWER URINARY TRACT SYMPTOMS: Chronic | Status: ACTIVE | Noted: 2022-08-05

## 2022-08-19 PROBLEM — N20.0 CALCULUS OF KIDNEY: Chronic | Status: ACTIVE | Noted: 2022-08-05

## 2022-08-19 PROBLEM — I10 ESSENTIAL (PRIMARY) HYPERTENSION: Chronic | Status: ACTIVE | Noted: 2022-08-05

## 2022-08-23 ENCOUNTER — TRANSCRIPTION ENCOUNTER (OUTPATIENT)
Age: 72
End: 2022-08-23

## 2022-08-25 ENCOUNTER — OUTPATIENT (OUTPATIENT)
Dept: OUTPATIENT SERVICES | Facility: HOSPITAL | Age: 72
LOS: 1 days | End: 2022-08-25
Payer: MEDICARE

## 2022-08-25 ENCOUNTER — RESULT REVIEW (OUTPATIENT)
Age: 72
End: 2022-08-25

## 2022-08-25 ENCOUNTER — APPOINTMENT (OUTPATIENT)
Dept: CT IMAGING | Facility: CLINIC | Age: 72
End: 2022-08-25

## 2022-08-25 DIAGNOSIS — Z00.8 ENCOUNTER FOR OTHER GENERAL EXAMINATION: ICD-10-CM

## 2022-08-25 PROCEDURE — 74177 CT ABD & PELVIS W/CONTRAST: CPT | Mod: MH

## 2022-08-25 PROCEDURE — 74177 CT ABD & PELVIS W/CONTRAST: CPT | Mod: 26,MH

## 2022-09-06 ENCOUNTER — OUTPATIENT (OUTPATIENT)
Dept: OUTPATIENT SERVICES | Facility: HOSPITAL | Age: 72
LOS: 1 days | End: 2022-09-06
Payer: MEDICARE

## 2022-09-06 VITALS
OXYGEN SATURATION: 98 % | HEIGHT: 69 IN | WEIGHT: 186.95 LBS | RESPIRATION RATE: 16 BRPM | TEMPERATURE: 98 F | HEART RATE: 75 BPM | DIASTOLIC BLOOD PRESSURE: 84 MMHG | SYSTOLIC BLOOD PRESSURE: 135 MMHG

## 2022-09-06 DIAGNOSIS — J34.2 DEVIATED NASAL SEPTUM: Chronic | ICD-10-CM

## 2022-09-06 DIAGNOSIS — Z98.890 OTHER SPECIFIED POSTPROCEDURAL STATES: Chronic | ICD-10-CM

## 2022-09-06 DIAGNOSIS — K36 OTHER APPENDICITIS: ICD-10-CM

## 2022-09-06 DIAGNOSIS — Z01.818 ENCOUNTER FOR OTHER PREPROCEDURAL EXAMINATION: ICD-10-CM

## 2022-09-06 LAB
ANION GAP SERPL CALC-SCNC: 13 MMOL/L — SIGNIFICANT CHANGE UP (ref 5–17)
BUN SERPL-MCNC: 21 MG/DL — SIGNIFICANT CHANGE UP (ref 7–23)
CALCIUM SERPL-MCNC: 9.6 MG/DL — SIGNIFICANT CHANGE UP (ref 8.4–10.5)
CHLORIDE SERPL-SCNC: 104 MMOL/L — SIGNIFICANT CHANGE UP (ref 96–108)
CO2 SERPL-SCNC: 25 MMOL/L — SIGNIFICANT CHANGE UP (ref 22–31)
CREAT SERPL-MCNC: 1.13 MG/DL — SIGNIFICANT CHANGE UP (ref 0.5–1.3)
EGFR: 69 ML/MIN/1.73M2 — SIGNIFICANT CHANGE UP
GLUCOSE SERPL-MCNC: 97 MG/DL — SIGNIFICANT CHANGE UP (ref 70–99)
HCT VFR BLD CALC: 39.1 % — SIGNIFICANT CHANGE UP (ref 39–50)
HGB BLD-MCNC: 12.4 G/DL — LOW (ref 13–17)
MCHC RBC-ENTMCNC: 30.3 PG — SIGNIFICANT CHANGE UP (ref 27–34)
MCHC RBC-ENTMCNC: 31.7 GM/DL — LOW (ref 32–36)
MCV RBC AUTO: 95.6 FL — SIGNIFICANT CHANGE UP (ref 80–100)
NRBC # BLD: 0 /100 WBCS — SIGNIFICANT CHANGE UP (ref 0–0)
PLATELET # BLD AUTO: 190 K/UL — SIGNIFICANT CHANGE UP (ref 150–400)
POTASSIUM SERPL-MCNC: 4.3 MMOL/L — SIGNIFICANT CHANGE UP (ref 3.5–5.3)
POTASSIUM SERPL-SCNC: 4.3 MMOL/L — SIGNIFICANT CHANGE UP (ref 3.5–5.3)
RBC # BLD: 4.09 M/UL — LOW (ref 4.2–5.8)
RBC # FLD: 14 % — SIGNIFICANT CHANGE UP (ref 10.3–14.5)
SARS-COV-2 RNA SPEC QL NAA+PROBE: SIGNIFICANT CHANGE UP
SODIUM SERPL-SCNC: 142 MMOL/L — SIGNIFICANT CHANGE UP (ref 135–145)
WBC # BLD: 7.83 K/UL — SIGNIFICANT CHANGE UP (ref 3.8–10.5)
WBC # FLD AUTO: 7.83 K/UL — SIGNIFICANT CHANGE UP (ref 3.8–10.5)

## 2022-09-06 PROCEDURE — U0005: CPT

## 2022-09-06 PROCEDURE — U0003: CPT

## 2022-09-06 PROCEDURE — 80048 BASIC METABOLIC PNL TOTAL CA: CPT

## 2022-09-06 PROCEDURE — G0463: CPT

## 2022-09-06 PROCEDURE — 85027 COMPLETE CBC AUTOMATED: CPT

## 2022-09-06 RX ORDER — SODIUM CHLORIDE 9 MG/ML
3 INJECTION INTRAMUSCULAR; INTRAVENOUS; SUBCUTANEOUS EVERY 8 HOURS
Refills: 0 | Status: DISCONTINUED | OUTPATIENT
Start: 2022-09-09 | End: 2022-09-23

## 2022-09-06 RX ORDER — SODIUM CHLORIDE 9 MG/ML
1000 INJECTION, SOLUTION INTRAVENOUS
Refills: 0 | Status: DISCONTINUED | OUTPATIENT
Start: 2022-09-09 | End: 2022-09-23

## 2022-09-06 NOTE — H&P PST ADULT - NSICDXPASTMEDICALHX_GEN_ALL_CORE_FT
PAST MEDICAL HISTORY:  BPH (benign prostatic hyperplasia)     H/O appendicitis     H/O hemorrhoids     HTN (hypertension)     Kidney stones      PAST MEDICAL HISTORY:  BPH (benign prostatic hyperplasia)     H/O appendicitis     H/O hemorrhoids     HTN (hypertension)     Kidney stones     VILMA (obstructive sleep apnea) "mild" no CPAP

## 2022-09-06 NOTE — H&P PST ADULT - PROBLEM SELECTOR PLAN 1
LAPAROSCOPIC APPENDECTOMY  Pre-op education provided - all questions answered   Chlorhex soap & instructions given  CBC, BMP, Covid PCR sent in PST

## 2022-09-06 NOTE — H&P PST ADULT - HISTORY OF PRESENT ILLNESS
Patient is a 71yo M w/ hx of HTN, BPH, kidney stones that presents after a CT noncontrast obtained at an outside office concerning for acute appendicitis. Patient reports waxing and waning fatigue for approximately 3-4 weeks, loss of appetite and 10-15 unintentional weight loss. Over the past 24 hrs, he developed RLQ abdominal pain, fevers and chills. No nausea or vomiting. Passing flatus and having bowel movements that sometimes are darker. Last colonoscopy was 2-3 yrs ago that per patient showed polyps that were removed.     In the ED, patient was febrile to 102.2 and tachycardic to 115. He received a dose of zosyn and one 1L bolus of LR.      ***Covid test sent 9/6/2022 at PST. 73YO M PMH HTN, VILMA (not on CPAP), BPH, kidney stones, appendicitis (hospitalized @ [facility] 8/5-8/15/2022), presents to [facility] for LAPAROSCOPIC APPENDECTOMY 9/9/2022. Denies any palpitations, SOB, N/V, Covid symptoms (fever, chills, cough) or exposure.      ***Covid test sent 9/6/2022 at PST. 73YO M PMH HTN, VILMA (not on CPAP), BPH, kidney stones, perforated appendicitis (hospitalized @ [facility] 8/5-8/15/2022 s/p IV antibiotics), presents to [facility] for LAPAROSCOPIC APPENDECTOMY 9/9/2022. Denies any palpitations, SOB, N/V, Covid symptoms (fever, chills, cough) or exposure.      ***Covid test sent 9/6/2022 at PST.

## 2022-09-06 NOTE — H&P PST ADULT - NSICDXPASTSURGICALHX_GEN_ALL_CORE_FT
PAST SURGICAL HISTORY:  Deviated septum ~20 years ago    H/O colonoscopy 2020    H/O detached retina repair ~1 year ago    Status post medial meniscus repair

## 2022-09-08 ENCOUNTER — TRANSCRIPTION ENCOUNTER (OUTPATIENT)
Age: 72
End: 2022-09-08

## 2022-09-09 ENCOUNTER — OUTPATIENT (OUTPATIENT)
Dept: OUTPATIENT SERVICES | Facility: HOSPITAL | Age: 72
LOS: 1 days | End: 2022-09-09
Payer: MEDICARE

## 2022-09-09 ENCOUNTER — TRANSCRIPTION ENCOUNTER (OUTPATIENT)
Age: 72
End: 2022-09-09

## 2022-09-09 ENCOUNTER — RESULT REVIEW (OUTPATIENT)
Age: 72
End: 2022-09-09

## 2022-09-09 VITALS
HEIGHT: 69 IN | OXYGEN SATURATION: 100 % | DIASTOLIC BLOOD PRESSURE: 80 MMHG | SYSTOLIC BLOOD PRESSURE: 125 MMHG | WEIGHT: 186.95 LBS | HEART RATE: 67 BPM | TEMPERATURE: 97 F | RESPIRATION RATE: 16 BRPM

## 2022-09-09 VITALS
RESPIRATION RATE: 15 BRPM | HEART RATE: 75 BPM | SYSTOLIC BLOOD PRESSURE: 145 MMHG | DIASTOLIC BLOOD PRESSURE: 70 MMHG | OXYGEN SATURATION: 100 %

## 2022-09-09 DIAGNOSIS — K36 OTHER APPENDICITIS: ICD-10-CM

## 2022-09-09 DIAGNOSIS — Z98.890 OTHER SPECIFIED POSTPROCEDURAL STATES: Chronic | ICD-10-CM

## 2022-09-09 DIAGNOSIS — J34.2 DEVIATED NASAL SEPTUM: Chronic | ICD-10-CM

## 2022-09-09 PROCEDURE — C9399: CPT

## 2022-09-09 PROCEDURE — C1889: CPT

## 2022-09-09 PROCEDURE — 44970 LAPAROSCOPY APPENDECTOMY: CPT

## 2022-09-09 DEVICE — STAPLER COVIDIEN TRI-STAPLE 60MM PURPLE RELOAD: Type: IMPLANTABLE DEVICE | Status: FUNCTIONAL

## 2022-09-09 RX ORDER — TAMSULOSIN HYDROCHLORIDE 0.4 MG/1
1 CAPSULE ORAL
Qty: 14 | Refills: 0
Start: 2022-09-09 | End: 2022-09-22

## 2022-09-09 RX ORDER — OXYCODONE HYDROCHLORIDE 5 MG/1
1 TABLET ORAL
Qty: 12 | Refills: 0
Start: 2022-09-09 | End: 2022-09-11

## 2022-09-09 RX ORDER — OXYCODONE HYDROCHLORIDE 5 MG/1
5 TABLET ORAL ONCE
Refills: 0 | Status: DISCONTINUED | OUTPATIENT
Start: 2022-09-09 | End: 2022-09-09

## 2022-09-09 RX ORDER — ONDANSETRON 8 MG/1
4 TABLET, FILM COATED ORAL ONCE
Refills: 0 | Status: DISCONTINUED | OUTPATIENT
Start: 2022-09-09 | End: 2022-09-23

## 2022-09-09 RX ORDER — LOSARTAN POTASSIUM 100 MG/1
1 TABLET, FILM COATED ORAL
Qty: 0 | Refills: 0 | DISCHARGE

## 2022-09-09 RX ORDER — TAMSULOSIN HYDROCHLORIDE 0.4 MG/1
0.4 CAPSULE ORAL ONCE
Refills: 0 | Status: DISCONTINUED | OUTPATIENT
Start: 2022-09-09 | End: 2022-09-23

## 2022-09-09 RX ORDER — LIDOCAINE HCL 20 MG/ML
0.2 VIAL (ML) INJECTION ONCE
Refills: 0 | Status: COMPLETED | OUTPATIENT
Start: 2022-09-09 | End: 2022-09-09

## 2022-09-09 RX ORDER — CHLORHEXIDINE GLUCONATE 213 G/1000ML
1 SOLUTION TOPICAL ONCE
Refills: 0 | Status: COMPLETED | OUTPATIENT
Start: 2022-09-09 | End: 2022-09-09

## 2022-09-09 RX ORDER — FINASTERIDE 5 MG/1
1 TABLET, FILM COATED ORAL
Qty: 0 | Refills: 0 | DISCHARGE

## 2022-09-09 RX ORDER — CEFOTETAN DISODIUM 1 G
2 VIAL (EA) INJECTION ONCE
Refills: 0 | Status: COMPLETED | OUTPATIENT
Start: 2022-09-09 | End: 2022-09-09

## 2022-09-09 RX ORDER — HYDROMORPHONE HYDROCHLORIDE 2 MG/ML
0.25 INJECTION INTRAMUSCULAR; INTRAVENOUS; SUBCUTANEOUS
Refills: 0 | Status: DISCONTINUED | OUTPATIENT
Start: 2022-09-09 | End: 2022-09-09

## 2022-09-09 RX ADMIN — SODIUM CHLORIDE 100 MILLILITER(S): 9 INJECTION, SOLUTION INTRAVENOUS at 06:07

## 2022-09-09 RX ADMIN — CHLORHEXIDINE GLUCONATE 1 APPLICATION(S): 213 SOLUTION TOPICAL at 06:07

## 2022-09-09 RX ADMIN — SODIUM CHLORIDE 3 MILLILITER(S): 9 INJECTION INTRAMUSCULAR; INTRAVENOUS; SUBCUTANEOUS at 05:56

## 2022-09-09 NOTE — ASU DISCHARGE PLAN (ADULT/PEDIATRIC) - ASU DC SPECIAL INSTRUCTIONSFT
Please follow up with Dr. Kilpatrick within 1-2 weeks after discharge from the hospital.  Please call the office if you are unable to urinate at home.  Call [phone] and return to the ED for chest pain, shortness of breath, significant increase in pain, or significant change in color of surgical sites.   Take tylenol and motrin as needed for pain control. If you continue to have pain you may take oxycodone.

## 2022-09-09 NOTE — ASU DISCHARGE PLAN (ADULT/PEDIATRIC) - CARE PROVIDER_API CALL
Ion Kilpatrick)  Surgery  [address], [address]  [place] [address]  Phone: [phone]  Fax: [phone]  Follow Up Time:

## 2022-09-09 NOTE — ASU DISCHARGE PLAN (ADULT/PEDIATRIC) - NS MD DC FALL RISK RISK
For information on Fall & Injury Prevention, visit: [email or web address] OR  [email or web address] OR  https://www.cdc.gov/steadi/patient.html

## 2022-09-09 NOTE — ASU PATIENT PROFILE, ADULT - NSICDXPASTMEDICALHX_GEN_ALL_CORE_FT
PAST MEDICAL HISTORY:  BPH (benign prostatic hyperplasia)     H/O appendicitis     H/O hemorrhoids     HTN (hypertension)     Kidney stones     VILMA (obstructive sleep apnea) "mild" no CPAP

## 2022-09-09 NOTE — PRE-ANESTHESIA EVALUATION ADULT - NSANTHPMHFT_GEN_ALL_CORE
71YO M PMH HTN, VILMA (not on CPAP), BPH, kidney stones, perforated appendicitis (hospitalized @ [facility] 8/5-8/15/2022 s/p IV antibiotics), presents to PST for LAPAROSCOPIC APPENDECTOMY 9/9/2022. Denies any palpitations, SOB, N/V, Covid symptoms (fever, chills, cough) or exposure.

## 2022-09-09 NOTE — BRIEF OPERATIVE NOTE - OPERATION/FINDINGS
s/p lap appendectomy with partial cecectomy for perforated and chronic appendicitis, chronic inflammation with rind seen
no

## 2022-09-09 NOTE — ASU DISCHARGE PLAN (ADULT/PEDIATRIC) - CALL YOUR DOCTOR IF YOU HAVE ANY OF THE FOLLOWING:
Bleeding that does not stop/Pain not relieved by Medications/Wound/Surgical Site with redness, or foul smelling discharge or pus/Nausea and vomiting that does not stop

## 2022-09-19 LAB — SURGICAL PATHOLOGY STUDY: SIGNIFICANT CHANGE UP

## 2022-11-24 ENCOUNTER — NON-APPOINTMENT (OUTPATIENT)
Age: 72
End: 2022-11-24

## 2024-01-11 ENCOUNTER — NON-APPOINTMENT (OUTPATIENT)
Age: 74
End: 2024-01-11

## (undated) DEVICE — VALVE YELLOW PORT SEAL PLUS 5MM

## (undated) DEVICE — TROCAR COVIDIEN VERSASTEP PLUS 12MM STANDARD

## (undated) DEVICE — INSUFFLATION NDL COVIDIEN STEP 14G SHORT FOR STEP/VERSASTEP

## (undated) DEVICE — DRSG STERISTRIPS 0.5 X 4"

## (undated) DEVICE — LIGASURE BLUNT TIP 37CM

## (undated) DEVICE — APPLICATOR SURGICEL LAP TROCAR POINT 2.5MM X 150MM

## (undated) DEVICE — SUT POLYSORB 0 30" GS-23

## (undated) DEVICE — TROCAR APPLIED MEDICAL KII BALLOON BLUNT TIP 12MM X 100MM

## (undated) DEVICE — DRSG OPSITE 2.5 X 2"

## (undated) DEVICE — DRAPE TOWEL BLUE 17" X 24"

## (undated) DEVICE — TUBING INSUFFLATION LAP FILTER 10FT

## (undated) DEVICE — DRSG TEGADERM 6"X8"

## (undated) DEVICE — GLV 7 PROTEXIS (WHITE)

## (undated) DEVICE — PACK LAP CHOLE LAP APPENDECTOMY

## (undated) DEVICE — POSITIONER FOAM EGG CRATE ULNAR 2PCS (PINK)

## (undated) DEVICE — INSUFFLATION NDL COVIDIEN STEP 14G FOR STEP/VERSASTEP

## (undated) DEVICE — TUBING IRRIGATION DAVOL SYSTEM X STREAM

## (undated) DEVICE — GOWN TRIMAX LG

## (undated) DEVICE — GLV 8.5 PROTEXIS (WHITE)

## (undated) DEVICE — VENODYNE/SCD SLEEVE CALF LARGE

## (undated) DEVICE — DISSECTOR ENDO PEANUT 5MM

## (undated) DEVICE — DRAPE INSTRUMENT POUCH 6.75" X 11"

## (undated) DEVICE — TROCAR ETHICON ENDOPATH XCEL BLADELESS 5MM X 100MM STABILITY

## (undated) DEVICE — PREP CHLORAPREP HI-LITE ORANGE 26ML

## (undated) DEVICE — GLV 7.5 PROTEXIS (WHITE)

## (undated) DEVICE — GLV 6.5 PROTEXIS (WHITE)

## (undated) DEVICE — BLADE SCALPEL SAFETYLOCK #10

## (undated) DEVICE — TROCAR COVIDIEN STEP 5MM SHORT 70MM

## (undated) DEVICE — ENDOCATCH 10MM SPECIMEN POUCH

## (undated) DEVICE — LIGASURE MARYLAND 37CM

## (undated) DEVICE — STAPLER COVIDIEN ENDO GIA STANDARD HANDLE

## (undated) DEVICE — TROCAR COVIDIEN VERSASTEP PLUS 11MM STANDARD

## (undated) DEVICE — SOL IRR POUR H2O 250ML

## (undated) DEVICE — TROCAR APPLIED MEDICAL KII BALLOON BLUNT TIP 12MM X 130MM

## (undated) DEVICE — TROCAR COVIDIEN VERSASTEP 5MM SHORT

## (undated) DEVICE — GLV 8 PROTEXIS (WHITE)

## (undated) DEVICE — SPECIMEN CONTAINER 100ML

## (undated) DEVICE — LIGASURE IMPACT

## (undated) DEVICE — D HELP - CLEARVIEW CLEARIFY SYSTEM

## (undated) DEVICE — SOL IRR POUR NS 0.9% 500ML

## (undated) DEVICE — SUT POLYSORB 0 36" GU-46

## (undated) DEVICE — TROCAR COVIDIEN BLUNT TIP HASSAN 10MM

## (undated) DEVICE — BLADE SCALPEL SAFETYLOCK #15

## (undated) DEVICE — SUT BIOSYN 4-0 18" P-12

## (undated) DEVICE — TROCAR COVIDIEN BLUNT TIP HASSAN 12MM

## (undated) DEVICE — WARMING BLANKET UPPER ADULT

## (undated) DEVICE — DRAPE GENERAL ENDOSCOPY

## (undated) DEVICE — MEDICATION LABELS W MARKER